# Patient Record
Sex: FEMALE | Race: WHITE | Employment: OTHER | URBAN - METROPOLITAN AREA
[De-identification: names, ages, dates, MRNs, and addresses within clinical notes are randomized per-mention and may not be internally consistent; named-entity substitution may affect disease eponyms.]

---

## 2022-07-12 ENCOUNTER — TRANSCRIBE ORDER (OUTPATIENT)
Dept: SCHEDULING | Age: 77
End: 2022-07-12

## 2022-07-12 DIAGNOSIS — M25.561 RIGHT KNEE PAIN: Primary | ICD-10-CM

## 2022-07-14 ENCOUNTER — HOSPITAL ENCOUNTER (OUTPATIENT)
Dept: PHYSICAL THERAPY | Age: 77
Discharge: HOME | End: 2022-07-14
Payer: MEDICARE

## 2022-07-14 PROCEDURE — 97161 PT EVAL LOW COMPLEX 20 MIN: CPT

## 2022-07-14 NOTE — PROGRESS NOTES
[facility] at [address] [place] [address] Drive: [phone]   Fax: [phone]  PLAN OF CARE / [address] [place] [address] PHYSICAL THERAPY SERVICES  Patient Name: Antwon Brooks : 1945   Medical   Diagnosis: Pain in right knee [M25.561] Treatment Diagnosis: Right knee pain   Onset Date: chronic     Referral Source: Cristiano Cazares MD Start of Care [facility]): 2022   Prior Hospitalization: See medical history Provider #: [id number]   Prior Level of Function: gardening   Comorbidities: Osteoporosis, OA, High blood pressure, Polymyositis, Prolapsed bladder   Medications: Verified on Patient Summary List   The Plan of Care and following information is based on the information from the initial evaluation.   ===========================================================================================  Assessment / key information:  Antwon Brooks is a 77 y.o. female presents with c/o right knee pain after falling on it 6 months ago due loss of balance. She reports a history of falling. Patient has polymyositis and has limited exercise capacity. She is tender to palpation over patella. She has reduced strength in bilateral LEs right more than left. She has reduced balance. Patient ambulates with independence demonstrating an antalgic gait pattern as she has reduced stance time on th right and decreased step length on the left. Patient ambulates with independence demonstrating an antalgic gait pattern as she has reduced stance time on the right and decreased step length on the left. Patient presentation is consistent with patellar femoral pain syndrome.  Patient will benefit from skilled aquatic and land based PT services to modify and progress therapeutic interventions, address functional mobility deficits, address ROM deficits, address strength deficits, analyze and address soft tissue restrictions, analyze and cue movement patterns, analyze and modify body mechanics/ergonomics and assess and modify postural abnormalities to attain her goals.      ===========================================================================================  Eval Complexity: History HIGH Complexity :3+ comorbidities / personal factors will impact the outcome/ POC ;  Examination  LOW Complexity : 1-2 Standardized tests and measures addressing body structure, function, activity limitation and / or participation in recreation ; Presentation LOW Complexity : Stable, uncomplicated ;  Decision Making MEDIUM Complexity : FOTO score of 26-74; Overall Complexity LOW   Problem List: pain affecting function, decrease ROM, decrease strength, edema affecting function, impaired gait/ balance, decrease ADL/ functional abilitiies, decrease activity tolerance, decrease flexibility/ joint mobility and decrease transfer abilities   Treatment Plan may include any combination of the following: Therapeutic exercise, Therapeutic activities, Neuromuscular re-education, Physical agent/modality, Gait/balance training, Manual therapy, Aquatic therapy, Patient education, Self Care training, Functional mobility training, Home safety training and Stair training  Patient / Family readiness to learn indicated by: asking questions, trying to perform skills and interest  Persons(s) to be included in education: patient (P)  Barriers to Learning/Limitations: no  Measures taken if barriers to learning:   Patient Goal (s): \"improvement\"   Patient self reported health status: good  Rehabilitation Potential: good  Goals:  Short Term Goals: To be accomplished in 4 weeks:   Patient will report compliance with HEP at least 1x/day to aid in rehabilitation program.   Status at IE:Will provide HEP on second visit   Current:Same as IE     Patient will demonstrate AROM of 0-140 degrees to aid in completion of ADLs. Status at IE:0-130 degrees   Current:Same as IE     Long Term Goals:  To be accomplished in 8 weeks:   Patient will increase strength to 5/5 throughout B LEs to aid in completion of ADLs. Status at IE:4-/5   Current:Same as IE     Patient will report pain less than 1-2/10 average to aid in completion of ADLs. Status at IE: 2-8/10   Current:Same as IE     Patient will perform 10 pain free kettle bell squats with 7lbs with good form/technique to aid in completion of ADLs. Status at IE: Pain with STS transfer   Current:Same as IE    Patient will improve FOTO to 55 points overall to demonstrate improvement in functional ability. Status at IE: FOTO score = 36 (an established functional score where 100 = no disability)   Current:Same as IE     Frequency / Duration:   Patient to be seen 2  times per week for 8  weeks:  Patient / Caregiver education and instruction: self care and exercises    Therapist Signature: Rios Hoffmann PT, DPT Date: 4/20/8725   Certification Period: 7/14/2022 - 9/12/2022 Time: 6:45 PM   ===========================================================================================  I certify that the above Physical Therapy Services are being furnished while the patient is under my care. I agree with the treatment plan and certify that this therapy is necessary. Physician Signature:        Date:       Time:     Please sign and return to [facility] or you may fax the signed copy to [phone]. Thank you.

## 2022-07-14 NOTE — PROGRESS NOTES
PT DAILY TREATMENT NOTE/KNEE EVAL 10-18    Patient Name: Jami Harris  Date:2022  : 1945  [x]  Patient  Verified  Payor: Jessica Born / Plan: [facility] [employer] MEDICARE CHOICE PPO/PFFS / Product Type: Managed Care Medicare /    In NQZS:4113  Out time:1020  Total Treatment Time (min): 45  Visit #: 1 of 16    Medicare/BCBS Only   Total Timed Codes (min):  0 1:1 Treatment Time:  45     Treatment Area: Pain in right knee [M25.561]    SUBJECTIVE  Pain Level (0-10 scale):  2  [x]constant []intermittent []improving []worsening []no change since onset    Any medication changes, allergies to medications, adverse drug reactions, diagnosis change, or new procedure performed?: [x] No    [] Yes (see summary sheet for update)  Subjective functional status/changes:       Patient presents with c/o right knee pain after falling on to it 6 months ago due loss of balance. Patient describes pain as sharp stabbing stiff. Denies numbness/tingling. Denies popping/clicking. Aggravating factors:movement, activity. Alleviating factors: rest.  Denies red flags: SOB, chest pain, dizziness/lightheadedness, blurred/double vision, HA, chills/fevers, night sweats, change in bowel/bladder control, abdominal pain, difficulty swallowing, slurred speech, unexplained weight gain/loss, nausea, vomiting. PMHx: Osteoporosis, OA, High blood pressure, Polymyositis, Prolapsed bladder Surgical Hx: . Social Hx: Lives with spouse in a single story home, work status: Retired. PLOF: Gardening. OBJECTIVE/EXAMINATION    45 min [x]Eval                  []Re-Eval          With   [x] TE   [] TA   [] neuro   [] other: Patient Education: [x] Review HEP    [] Progressed/Changed HEP based on:   [] positioning   [] body mechanics   [] transfers   [] heat/ice application    [] other:        Physical Therapy Evaluation - Knee    Posture:  Forward head and rounded shoulders    Gait:  [] Normal    [] Abnormal    [x] Antalgic    [] NWB    Device: none    Describe: Patient ambulates with independence demonstrating an antalgic gait pattern as she has reduced stance time on th right and decreased step length on the left. ROM / Strength  [] Unable to assess                  AROM                 Strength (1-5)    Left Right Left Right   Hip Flexion   4- 4-    Extension   4- 4-    Abduction   4- 4-    Adduction   4 4   Knee Flexion 140 130 4 3+    Extension 0 0 4 4-   Ankle Plantarflexion   4 4-    Dorsiflexion   4 4-       Palpation:   Neg/Pos  Neg/Pos  Neg/Pos   Joint Line Neg Quad tendon Neg Patellar ligament Pos   Patella Pos Fibular head Neg Pes Anserinus Neg   Tibial tubercle Neg Hamstring tendons Neg Infrapatellar fat pad Neg     Optional Tests:  Patellar Mobility   []L []R Hypermobile [x]L [x]R Hypomobile         Girth Measurements:     Cm above    Left 32   Right  35     Other tests/comments:  Requires UEA to perform SLS     Pain Level (0-10 scale) post treatment: 2    ASSESSMENT/Changes in Function:     Patient presents with c/o right knee pain after falling on it 6 months ago due loss of balance. She reports a history of falling. Patient has polymyositis and has limited exercise capacity. She is tender to palpation over patella. She has reduced strength in bilateral LEs right more than left. She has reduced balance. Patient ambulates with independence demonstrating an antalgic gait pattern as she has reduced stance time on th right and decreased step length on the left. Patient ambulates with independence demonstrating an antalgic gait pattern as she has reduced stance time on the right and decreased step length on the left. Patient presentation is consistent with patellar femoral pain syndrome.  Patient will benefit from skilled aquatic and land based PT services to modify and progress therapeutic interventions, address functional mobility deficits, address ROM deficits, address strength deficits, analyze and address soft tissue restrictions, analyze and cue movement patterns, analyze and modify body mechanics/ergonomics and assess and modify postural abnormalities to attain her goals.      []  See Plan of Care  []  See progress note/recertification  []  See Discharge Summary         Progress towards goals / Updated goals:  See POC    PLAN  []  Upgrade activities as tolerated     [x]  Continue plan of care  []  Update interventions per flow sheet       []  Discharge due to:_  []  Other:_      Jeanie Cruz PT, DPT 7/14/2022  9:38 AM

## 2022-07-18 ENCOUNTER — HOSPITAL ENCOUNTER (OUTPATIENT)
Dept: PHYSICAL THERAPY | Age: 77
End: 2022-07-18
Payer: MEDICARE

## 2022-07-21 ENCOUNTER — HOSPITAL ENCOUNTER (OUTPATIENT)
Dept: PHYSICAL THERAPY | Age: 77
Discharge: HOME | End: 2022-07-21
Payer: MEDICARE

## 2022-07-21 PROCEDURE — 97530 THERAPEUTIC ACTIVITIES: CPT

## 2022-07-21 PROCEDURE — 97110 THERAPEUTIC EXERCISES: CPT

## 2022-07-21 NOTE — PROGRESS NOTES
PT DAILY TREATMENT NOTE - [facility]     Patient Name: Cee Skelton  Date:2022  : 1945  [x]  Patient  Verified  Payor: Nida Bautista / Plan: [facility] HUMANA MEDICARE CHOICE PPO/PFFS / Product Type: Managed Care Medicare /    In time:1100  Out time:1140  Total Treatment Time (min): 40  Total Timed Codes (min): 40   1:1 Treatment Time ([facility] only): 40   Visit #: 2 of 16    Treatment Area: Pain in right knee [M25.561]    SUBJECTIVE  Pain Level (0-10 scale): 5  Any medication changes, allergies to medications, adverse drug reactions, diagnosis change, or new procedure performed?: [x] No    [] Yes (see summary sheet for update)  Subjective functional status/changes:   [] No changes reported    Patient reports that she has increased knee pain today. OBJECTIVE    30 min Therapeutic Exercise:  [x] See flow sheet :   Rationale: increase ROM, increase strength and decrease pain to improve the patients ability to complete ADLs    10 min Therapeutic Activity:  [x]  See flow sheet :   Rationale: increase ROM, increase strength and improve coordination  to improve the patients ability to complete ADLs         With   [x] TE   [] TA   [] neuro   [] other: Patient Education: [x] Review HEP    [] Progressed/Changed HEP based on:   [] positioning   [] body mechanics   [] transfers   [] heat/ice application    [] other:      Other Objective/Functional Measures: NA     Pain Level (0-10 scale) post treatment: 3    ASSESSMENT/Changes in Function: Patient responds well to treatment session. Provided cues to recall exercise parameters and for activity pacing. Encouraged muscle recovery periods to promote quality muscle recruitment during exercise. Reviewed HEP and provided handout.  No adverse effects were noted from today's treatment session    Patient will continue to benefit from skilled PT services to modify and progress therapeutic interventions, address functional mobility deficits, address ROM deficits, address strength deficits, analyze and address soft tissue restrictions, analyze and cue movement patterns, analyze and modify body mechanics/ergonomics, assess and modify postural abnormalities, address imbalance and instruct in home and community integration to attain remaining goals. []  See Plan of Care  []  See progress note/recertification  []  See Discharge Summary         Progress towards goals / Updated goals:  Short Term Goals: To be accomplished in 4 weeks:                 Patient will report compliance with HEP at least 1x/day to aid in rehabilitation program.                 Status at IE:Will provide HEP on second visit                 Current: In-progress, provided initial HEP, 7/21/2022                    Patient will demonstrate AROM of 0-140 degrees to aid in completion of ADLs. Status at IE:0-130 degrees                 Current:Same as IE      Long Term Goals: To be accomplished in 8 weeks:                 Patient will increase strength to 5/5 throughout B LEs to aid in completion of ADLs. Status at IE:4-/5                 Current:Same as IE                    Patient will report pain less than 1-2/10 average to aid in completion of ADLs. Status at IE: 2-8/10                 Current:Same as IE                    Patient will perform 10 pain free kettle bell squats with 7lbs with good form/technique to aid in completion of ADLs. Status at IE: Pain with STS transfer                 Current:Same as IE     Patient will improve FOTO to 55 points overall to demonstrate improvement in functional ability.                  Status at IE: FOTO score = 36 (an established functional score where 100 = no disability)                 Current:Same as IE     PLAN  []  Upgrade activities as tolerated     [x]  Continue plan of care  []  Update interventions per flow sheet       []  Discharge due to:_  []  Other:_      Joanne Andrew, PT, DPT 7/21/2022  11:04 AM    Future Appointments   Date Time Provider Mally Jamison   7/28/2022  4:45 PM Grieclda CALLAWAY [facility]   8/2/2022  9:30 AM MARINA Worthington [facility]   8/4/2022  9:30 AM HOOD Crenshaw THE [facility]

## 2022-07-28 ENCOUNTER — HOSPITAL ENCOUNTER (OUTPATIENT)
Dept: PHYSICAL THERAPY | Age: 77
Discharge: HOME | End: 2022-07-28
Payer: MEDICARE

## 2022-07-28 PROCEDURE — 97016 VASOPNEUMATIC DEVICE THERAPY: CPT

## 2022-07-28 PROCEDURE — 97110 THERAPEUTIC EXERCISES: CPT

## 2022-07-28 PROCEDURE — 97112 NEUROMUSCULAR REEDUCATION: CPT

## 2022-07-28 PROCEDURE — 97530 THERAPEUTIC ACTIVITIES: CPT

## 2022-07-28 NOTE — PROGRESS NOTES
PT DAILY TREATMENT NOTE    Patient Name: Ilda Goldstein  Date:2022  : 1945  [x]  Patient  Verified  Payor: Jimmie Aguayo / Plan: [facility] HUMAN MEDICARE CHOICE PPO/PFFS / Product Type: Managed Care Medicare /    In time:450  Out time:540  Total Treatment Time (min): 50  Total Timed Codes (min): 40  1:1 Treatment Time ([facility] only):    Visit #: 3 of 16    Treatment Dx: Pain in right knee [M25.561]    SUBJECTIVE  Pain Level (0-10 scale): 6  Any medication changes, allergies to medications, adverse drug reactions, diagnosis change, or new procedure performed?: [x] No    [] Yes (see summary sheet for update)  Subjective functional status/changes:   [] No changes reported  \"The knee injection is wearing off. I can start feeling the pain in my knee again. \"    OBJECTIVE    Modalities Rationale:     decrease edema, decrease inflammation, decrease pain, and increase tissue extensibility to improve patient's ability to return PLOF   min [] Estim, type/location:                                      []  att     []  unatt     []  w/US     []  w/ice    []  w/heat    min []  Mechanical Traction: type/lbs                   []  pro   []  sup   []  int   []  cont    []  before manual    []  after manual    min []  Ultrasound, settings/location:      min []  Iontophoresis w/ dexamethasone, location:                                               []  take home patch       []  in clinic    min []  Ice     []  Heat    location/position:    10 min [x]  Vasopneumatic Device, press/temp: Right knee/medium/34 degrees   If using vaso (only need to measure limb vaso being performed on)      pre-treatment girth : 38 cm      post-treatment girth 37.5 cm      measured at (landmark location) :  mid-patella    min []  Other:    [x] Skin assessment post-treatment (if applicable):    [x]  intact    []  redness- no adverse reaction                  []redness - adverse reaction:       20 min Therapeutic Exercise:  [x] See flow sheet : Rationale: increase ROM, increase strength, improve coordination, and improve balance to improve the patients ability to return PLOF    10 min Therapeutic Activity:  [x]  See flow sheet :   Rationale: increase ROM, increase strength, improve coordination, and improve balance  to improve the patients ability to perform ADL's without pain and symptom levels     10 min Neuromuscular Re-education:  [x]  See flow sheet :   Rationale: increase ROM, increase strength, improve coordination, and improve balance  to improve the patients ability to perform ADL's without pain and symptom levels              With   [] TE   [] TA   [] neuro   [] other: Patient Education: [x] Review HEP    [] Progressed/Changed HEP based on:   [] positioning   [] body mechanics   [] transfers   [] heat/ice application    [] other:      Other Objective/Functional Measures:   -difficulty with SLR demonstrate muscle weakness. Pain Level (0-10 scale) post treatment: 0    ASSESSMENT/Changes in Function:   Patient tolerated treatment session well today. Patient challenge with SLR, demonstrate muscle weakness. Cues with eccentric quadriceps control. Patient continues to have difficulty with prolonged walking with increased knee pain. Patient continues to make steady progress toward goals and would benefit from continued skilled PT intervention to address remaining deficits outlined in goals below. Patient will continue to benefit from skilled PT services to modify and progress therapeutic interventions, address functional mobility deficits, address ROM deficits, address strength deficits, analyze and address soft tissue restrictions, analyze and cue movement patterns, analyze and modify body mechanics/ergonomics, and assess and modify postural abnormalities to attain remaining goals.      [x]  See Plan of Care  []  See progress note/recertification  []  See Discharge Summary         Progress towards goals / Updated goals:  Short Term Goals: To be accomplished in 4 weeks:                 Patient will report compliance with HEP at least 1x/day to aid in rehabilitation program.                 Status at IE:Will provide HEP on second visit                 Current: In-progress, provided initial HEP, 7/21/2022                    Patient will demonstrate AROM of 0-140 degrees to aid in completion of ADLs. Status at IE:0-130 degrees                 Current:Same as IE      Long Term Goals: To be accomplished in 8 weeks:                 Patient will increase strength to 5/5 throughout B LEs to aid in completion of ADLs. Status at IE:4-/5                 Current:Same as IE                    Patient will report pain less than 1-2/10 average to aid in completion of ADLs. Status at IE: 2-8/10                 Current: 7/10 pain at worst with ADL's. 7/28/22 IN PROGRESS                    Patient will perform 10 pain free kettle bell squats with 7lbs with good form/technique to aid in completion of ADLs. Status at IE: Pain with STS transfer                 Current:Same as IE     Patient will improve FOTO to 55 points overall to demonstrate improvement in functional ability.                  Status at IE: FOTO score = 36 (an established functional score where 100 = no disability)                 Current:Same as IE       PLAN  []  Upgrade activities as tolerated     [x]  Continue plan of care  []  Update interventions per flow sheet       []  Discharge due to:_  []  Other:_      Pam Luke PTA 7/28/2022  9:23 AM    Future Appointments   Date Time Provider Mally Jamison   7/28/2022  4:45 PM HOOD HutchinsTFABI [facility]   8/2/2022  9:30 AM Luis Alberto Alert, PT [facility]   8/4/2022  9:30 AM Edi Rucker PTA [facility]   8/9/2022  9:30 AM Luis Alberto Alert, PT [facility]   8/11/2022  8:00 AM Nia Hidalgo PT [facility]

## 2022-08-02 ENCOUNTER — HOSPITAL ENCOUNTER (OUTPATIENT)
Dept: PHYSICAL THERAPY | Age: 77
Discharge: HOME | End: 2022-08-02
Payer: MEDICARE

## 2022-08-02 PROCEDURE — 97112 NEUROMUSCULAR REEDUCATION: CPT

## 2022-08-02 PROCEDURE — 97110 THERAPEUTIC EXERCISES: CPT

## 2022-08-02 PROCEDURE — 97530 THERAPEUTIC ACTIVITIES: CPT

## 2022-08-02 NOTE — PROGRESS NOTES
PT DAILY TREATMENT NOTE    Patient Name: Ernesto Grimm  Date:2022  : 1945  [x]  Patient  Verified  Payor: Basia Brand / Plan: [facility] [employer] MEDICARE CHOICE PPO/PFFS / Product Type: Managed Care Medicare /    In time: 379  Out time: 1009  Total Treatment Time (min): 44  Total Timed Codes (min): 44  1:1 Treatment Time (MC only): 40   Visit #: 4 of 16    Treatment Dx: Pain in right knee [M25.561]    SUBJECTIVE  Pain Level (0-10 scale): 0  Any medication changes, allergies to medications, adverse drug reactions, diagnosis change, or new procedure performed?: [x] No    [] Yes (see summary sheet for update)  Subjective functional status/changes:   [] No changes reported  \"The knee has been pretty calm lately. But, it still gives me a sharp pain with certain motions. \"    OBJECTIVE    15 min Therapeutic Exercise:  [x] See flow sheet :   Rationale: increase ROM, increase strength and decrease pain to improve the patients ability to complete ADLs  ambulation safety and efficiency in order to improve patient's ability to safely ambulate at home for self care. 15 min Therapeutic Activity:  [x]  See flow sheet :   Rationale: increase ROM, increase strength and improve coordination  to improve the patients ability to Complete ADLS     14 min Neuromuscular Re-education:  [x]  See flow sheet :   Rationale: improve coordination, improve balance and increase proprioception  to improve the patients ability to complete ADLS, and decrease falls risk    With   [] TE   [] TA   [] neuro   [] other: Patient Education: [x] Review HEP    [] Progressed/Changed HEP based on:   [] positioning   [] body mechanics   [] transfers   [] heat/ice application    [] other:      Other Objective/Functional Measures: NA     Pain Level (0-10 scale) post treatment: 0    ASSESSMENT/Changes in Function: Patient instructed in initial Aquatic Exercise Program for Management of Knee Pain.  Noted slight increased pain with hip flexor stretches (butt kicks) but tolerated all other exercises well. Patient responds well to treatment session. No adverse effects were noted from today's treatment session. Patient will continue to benefit from skilled PT services to modify and progress therapeutic interventions, address functional mobility deficits, address ROM deficits, address strength deficits, analyze and address soft tissue restrictions, analyze and cue movement patterns, analyze and modify body mechanics/ergonomics, assess and modify postural abnormalities,  and instruct in home and community integration to attain remaining goals. [x]  See Plan of Care  []  See progress note/recertification  []  See Discharge Summary         Progress towards goals / Updated goals:  Short Term Goals: To be accomplished in 4 weeks:                 Patient will report compliance with HEP at least 1x/day to aid in rehabilitation program.                 Status at IE:Will provide HEP on second visit                 Current: IPatient instructed in initial Aquatic Exercise Program for Management of Knee Pain. 8/2/2022, in progress                    Patient will demonstrate AROM of 0-140 degrees to aid in completion of ADLs. Status at IE:0-130 degrees                 Current:Same as IE      Long Term Goals: To be accomplished in 8 weeks:                 Patient will increase strength to 5/5 throughout B LEs to aid in completion of ADLs. Status at IE:4-/5                 Current:Same as IE                    Patient will report pain less than 1-2/10 average to aid in completion of ADLs. Status at IE: 2-8/10                 Current: 7/10 pain at worst with ADL's. 7/28/22 IN PROGRESS                    Patient will perform 10 pain free kettle bell squats with 7lbs with good form/technique to aid in completion of ADLs.                  Status at IE: Pain with STS transfer                 Current:Same as IE     Patient will improve FOTO to 55 points overall to demonstrate improvement in functional ability.                  Status at IE: FOTO score = 36 (an established functional score where 100 = no disability)                 Current:Same as IE    PLAN  []  Upgrade activities as tolerated     [x]  Continue plan of care  []  Update interventions per flow sheet       []  Discharge due to:_  []  Other:_      Xin Polk, PT 8/2/2022  10:08 AM    Future Appointments   Date Time Provider Mally Jamison   8/4/2022  9:30 AM Manjeet Wilkinson, PTA [facility]   8/9/2022  9:30 AM Arcenio Connors, PT [facility]   8/11/2022  8:00 AM Sarah Hernandez, PT [facility]

## 2022-08-04 ENCOUNTER — HOSPITAL ENCOUNTER (OUTPATIENT)
Dept: PHYSICAL THERAPY | Age: 77
Discharge: HOME | End: 2022-08-04
Payer: MEDICARE

## 2022-08-04 PROCEDURE — 97016 VASOPNEUMATIC DEVICE THERAPY: CPT

## 2022-08-04 PROCEDURE — 97140 MANUAL THERAPY 1/> REGIONS: CPT

## 2022-08-04 PROCEDURE — 97530 THERAPEUTIC ACTIVITIES: CPT

## 2022-08-04 PROCEDURE — 97110 THERAPEUTIC EXERCISES: CPT

## 2022-08-04 NOTE — PROGRESS NOTES
PT DAILY TREATMENT NOTE    Patient Name: Catalina Grew  Date:2022  : 1945  [x]  Patient  Verified  Payor: Dahlia Ling / Plan: [facility] [employer] MEDICARE CHOICE PPO/PFFS / Product Type: Managed Care Medicare /    In time:928  Out time:1025  Total Treatment Time (min): 57  Total Timed Codes (min): 47  1:1 Treatment Time (MC/BCBS only): 52   Visit #: 5 of 16    Treatment Dx: Pain in right knee [M25.561]    SUBJECTIVE  Pain Level (0-10 scale): 7-8  Any medication changes, allergies to medications, adverse drug reactions, diagnosis change, or new procedure performed?: [x] No    [] Yes (see summary sheet for update)  Subjective functional status/changes:   [] No changes reported  Pt reports of overall soreness after last session with aquatic therapy. She states of right LE tightness and pain today.     OBJECTIVE    Modalities Rationale:     decrease edema, decrease inflammation, and decrease pain to improve patient's ability to return PLOF   min [] Estim, type/location:                                      []  att     []  unatt     []  w/US     []  w/ice    []  w/heat    min []  Mechanical Traction: type/lbs                   []  pro   []  sup   []  int   []  cont    []  before manual    []  after manual    min []  Ultrasound, settings/location:      min []  Iontophoresis w/ dexamethasone, location:                                               []  take home patch       []  in clinic    min []  Ice     []  Heat    location/position:    10 min [x]  Vasopneumatic Device, press/temp: Right knee/ medium/34 degrees   If using vaso (only need to measure limb vaso being performed on)      pre-treatment girth : 34.5 cm      post-treatment girth : 33.5 cm      measured at (landmark location) :  mid-patella    min []  Other:    [x] Skin assessment post-treatment (if applicable):    [x]  intact    []  redness- no adverse reaction                  []redness - adverse reaction:          20 min Therapeutic Exercise: [x] See flow sheet :   Rationale: increase ROM, increase strength, improve coordination, and improve balance to improve the patients ability to return PLOF    17 min Therapeutic Activity:  [x]  See flow sheet :   Rationale: increase ROM, increase strength, improve coordination, and improve balance  to improve the patients ability to perform ADL's without pain and symptom levels     10 min Manual Therapy:  supine/ STM to hamstrings, quads, and calf /right knee PROM flexion and extension   Rationale: decrease pain, increase ROM, increase tissue extensibility, decrease edema , and decrease trigger points to improve the patients ability to perform ADL's without pain and symptom levels  The manual therapy interventions were performed at a separate and distinct time from the therapeutic activities interventions. With   [] TE   [] TA   [] neuro   [] other: Patient Education: [x] Review HEP    [] Progressed/Changed HEP based on:   [] positioning   [] body mechanics   [] transfers   [x] heat/ice application    [] other:      Other Objective/Functional Measures:   Pt improved with SLR with less pain with increase hip ROM    Pain Level (0-10 scale) post treatment: 5    ASSESSMENT/Changes in Function:   Patient reports of soreness and pain post aquatic session. Today patient was provided with gentle exercises due to increased pain since last visit. Added ball squeeze and hamstring stretch to improve knee mobility and strength. Cues to maintain 5 seconds hold with each repetition. Patient had good tolerance with exercise program today without any adverse reaction. Patient improved with SLR with less pain with increase hip ROM. Patient demonstrated good progression with right knee AROM 0-138 degrees today. Patient continues to have difficulty with going up and down the  the stairs and STS at home due to right knee pain. Patient education on icing to reduce swelling.  Patient continues to make steady progress toward goals and would benefit from continued skilled PT intervention to address remaining deficits outlined in goals below. Patient will continue to benefit from skilled PT services to modify and progress therapeutic interventions, address functional mobility deficits, address ROM deficits, address strength deficits, analyze and address soft tissue restrictions, analyze and cue movement patterns, analyze and modify body mechanics/ergonomics, and assess and modify postural abnormalities to attain remaining goals. [x]  See Plan of Care  []  See progress note/recertification  []  See Discharge Summary         Progress towards goals / Updated goals:  Short Term Goals: To be accomplished in 4 weeks:                 Patient will report compliance with HEP at least 1x/day to aid in rehabilitation program.                 Status at IE:Will provide HEP on second visit                 Current: IPatient instructed in initial Aquatic Exercise Program for Management of Knee Pain. 8/2/2022, in progress                    Patient will demonstrate AROM of 0-140 degrees to aid in completion of ADLs. Status at IE:0-130 degrees                 Current 8/4/22: Right knee AROM 0-138 degrees. IN PROGRESS      Long Term Goals: To be accomplished in 8 weeks:                 Patient will increase strength to 5/5 throughout B LEs to aid in completion of ADLs. Status at IE:4-/5                 Current:Same as IE                    Patient will report pain less than 1-2/10 average to aid in completion of ADLs. Status at IE: 2-8/10                 Current: 7/10 pain at worst with ADL's. 7/28/22 IN PROGRESS                    Patient will perform 10 pain free kettle bell squats with 7lbs with good form/technique to aid in completion of ADLs.                  Status at IE: Pain with STS transfer                 Current:Same as IE     Patient will improve FOTO to 55 points overall to demonstrate improvement in functional ability.                  Status at IE: FOTO score = 36 (an established functional score where 100 = no disability)                 Current:Same as IE       PLAN  []  Upgrade activities as tolerated     [x]  Continue plan of care  []  Update interventions per flow sheet       []  Discharge due to:_  []  Other:_      Mary Villavicencio PTA 8/4/2022  9:23 AM    Future Appointments   Date Time Provider Mally Jamison   8/4/2022  9:30 AM [facility]   8/9/2022  9:30 AM Walt Habermann, PT [facility]   8/11/2022  8:00 AM Claudene Cons, PT [facility]

## 2022-08-09 ENCOUNTER — HOSPITAL ENCOUNTER (OUTPATIENT)
Dept: PHYSICAL THERAPY | Age: 77
Discharge: HOME | End: 2022-08-09
Payer: MEDICARE

## 2022-08-09 PROCEDURE — 97530 THERAPEUTIC ACTIVITIES: CPT

## 2022-08-09 PROCEDURE — 97112 NEUROMUSCULAR REEDUCATION: CPT

## 2022-08-09 PROCEDURE — 97110 THERAPEUTIC EXERCISES: CPT

## 2022-08-09 NOTE — PROGRESS NOTES
PT DAILY TREATMENT NOTE    Patient Name: Hue Thomas  Date:2022  : 1945  [x]  Patient  Verified  Payor: Daron  / Plan: [facility] [employer] MEDICARE CHOICE PPO/PFFS / Product Type: Managed Care Medicare /    In time: 226  Out time: 4351  Total Treatment Time (min): 53  Total Timed Codes (min): 48  1:1 Treatment Time ( only): 40   Visit #: 6 of 16    Treatment Dx: Pain in right knee [M25.561]    SUBJECTIVE  Pain Level (0-10 scale): 3  Any medication changes, allergies to medications, adverse drug reactions, diagnosis change, or new procedure performed?: [x] No    [] Yes (see summary sheet for update)  Subjective functional status/changes:   [] No changes reported  \"Pain is not too bad today. \"    OBJECTIVE    15 min Therapeutic Exercise:  [x] See flow sheet :   Rationale: increase ROM, increase strength and decrease pain to improve the patients ability to complete ADLs  ambulation safety and efficiency in order to improve patient's ability to safely ambulate at home for self care. 15 min Therapeutic Activity:  [x]  See flow sheet :   Rationale: increase ROM, increase strength and improve coordination  to improve the patients ability to Complete ADLS     10 min Neuromuscular Re-education:  [x]  See flow sheet :   Rationale: improve coordination, improve balance and increase proprioception  to improve the patients ability to complete ADLS, and decrease falls risk    With   [] TE   [] TA   [] neuro   [] other: Patient Education: [x] Review HEP    [] Progressed/Changed HEP based on:   [] positioning   [] body mechanics   [] transfers   [] heat/ice application    [] other:      Other Objective/Functional Measures: NA     Pain Level (0-10 scale) post treatment: 1    ASSESSMENT/Changes in Function: Patient educated further in optimal technique and rationale for sequence of aquatic exercises. Patient responds well to treatment session.   No adverse effects were noted from today's treatment session. Patient will continue to benefit from skilled PT services to modify and progress therapeutic interventions, address functional mobility deficits, address ROM deficits, address strength deficits, analyze and address soft tissue restrictions, analyze and cue movement patterns, analyze and modify body mechanics/ergonomics, assess and modify postural abnormalities,  and instruct in home and community integration to attain remaining goals. [x]  See Plan of Care  []  See progress note/recertification  []  See Discharge Summary         Progress towards goals / Updated goals:  Short Term Goals: To be accomplished in 4 weeks:                 Patient will report compliance with HEP at least 1x/day to aid in rehabilitation program.                 Status at IE:Will provide HEP on second visit                 Current: IPatient instructed in initial Aquatic Exercise Program for Management of Knee Pain. 8/2/2022, in progress                    Patient will demonstrate AROM of 0-140 degrees to aid in completion of ADLs. Status at IE:0-130 degrees                 Current 8/4/22: Right knee AROM 0-138 degrees. IN PROGRESS      Long Term Goals: To be accomplished in 8 weeks:                 Patient will increase strength to 5/5 throughout B LEs to aid in completion of ADLs. Status at IE:4-/5                 Current:Same as IE                    Patient will report pain less than 1-2/10 average to aid in completion of ADLs. Status at IE: 2-8/10                 Current: Pain reduced to 3/10 intensity today. 8/9/2022, IN PROGRESS                    Patient will perform 10 pain free kettle bell squats with 7lbs with good form/technique to aid in completion of ADLs. Status at IE: Pain with STS transfer                 Current:Same as IE     Patient will improve FOTO to 55 points overall to demonstrate improvement in functional ability. Status at IE: FOTO score = 36 (an established functional score where 100 = no disability)                 Current:Same as IE    PLAN  []  Upgrade activities as tolerated     [x]  Continue plan of care  []  Update interventions per flow sheet       []  Discharge due to:_  []  Other:_      Keli Astorga, PT 8/9/2022  9:43 AM    Future Appointments   Date Time Provider Mally Jamison   8/11/2022  8:00 AM Carmen Cooley, PT [facility]   8/16/2022 10:15 AM Jomar Hinson, PT [facility]   8/18/2022  9:30 AM Jomar Hinson PT [facility]   8/23/2022  9:30 AM Jomar Hnison PT [facility]   8/25/2022  9:30 AM Isael Philippe, HOOD [facility]

## 2022-08-11 ENCOUNTER — HOSPITAL ENCOUNTER (OUTPATIENT)
Dept: PHYSICAL THERAPY | Age: 77
Discharge: HOME | End: 2022-08-11
Payer: MEDICARE

## 2022-08-11 PROCEDURE — 97016 VASOPNEUMATIC DEVICE THERAPY: CPT

## 2022-08-11 PROCEDURE — 97530 THERAPEUTIC ACTIVITIES: CPT

## 2022-08-11 PROCEDURE — 97110 THERAPEUTIC EXERCISES: CPT

## 2022-08-11 PROCEDURE — 97140 MANUAL THERAPY 1/> REGIONS: CPT

## 2022-08-11 NOTE — PROGRESS NOTES
PT DAILY TREATMENT NOTE    Patient Name: Akira Phoenix  Date:2022  : 1945  [x]  Patient  Verified  Payor: Elle Merchant / Plan: [facility] [employer] MEDICARE CHOICE PPO/PFFS / Product Type: Managed Care Medicare /    In time:358  Out time:447  Total Treatment Time (min): 49  Total Timed Codes (min): 39  1:1 Treatment Time (MC/BCBS only): 39   Visit #: 7 of 16    Treatment Dx: Pain in right knee [M25.561]    SUBJECTIVE  Pain Level (0-10 scale): 4  Any medication changes, allergies to medications, adverse drug reactions, diagnosis change, or new procedure performed?: [x] No    [] Yes (see summary sheet for update)  Subjective functional status/changes:   [] No changes reported  \"My body is achy all over today. \"    OBJECTIVE    Modalities Rationale:     decrease edema, decrease inflammation, decrease pain, and increase tissue extensibility to improve patient's ability to return PLOF   min [] Estim, type/location:                                      []  att     []  unatt     []  w/US     []  w/ice    []  w/heat    min []  Mechanical Traction: type/lbs                   []  pro   []  sup   []  int   []  cont    []  before manual    []  after manual    min []  Ultrasound, settings/location:      min []  Iontophoresis w/ dexamethasone, location:                                               []  take home patch       []  in clinic    min []  Ice     []  Heat    location/position:    10 min [x]  Vasopneumatic Device, press/temp: Right knee/ medium/ 34 degrees   If using vaso (only need to measure limb vaso being performed on)      pre-treatment girth : 34.5 cm      post-treatment girth : 34 cm      measured at (landmark location) :  mid-patella    min []  Other:    [x] Skin assessment post-treatment (if applicable):    [x]  intact    []  redness- no adverse reaction                  []redness - adverse reaction:      19 min Therapeutic Exercise:  [x] See flow sheet :   Rationale: increase ROM, increase strength, improve coordination, and improve balance to improve the patients ability to return PLOF    10 min Therapeutic Activity:  [x]  See flow sheet :   Rationale: increase ROM, increase strength, improve coordination, and improve balance  to improve the patients ability to perform ADL's without pain and symptom levels     10 min Manual Therapy:  hooklying/ right knee STM hamstrings and gastrocnemius/ PROM right knee flexion and extension    Rationale: decrease pain, increase ROM, increase tissue extensibility, decrease edema , decrease trigger points, and increase postural awareness to improve the patients ability to perform prior physical activities  The manual therapy interventions were performed at a separate and distinct time from the therapeutic activities interventions. With   [] TE   [] TA   [] neuro   [] other: Patient Education: [x] Review HEP    [] Progressed/Changed HEP based on:   [] positioning   [] body mechanics   [] transfers   [] heat/ice application    [] other:      Other Objective/Functional Measures: NA     Pain Level (0-10 scale) post treatment: 0    ASSESSMENT/Changes in Function:   Patient tolerated treatment session well today. Patient demonstrated muscle fatigue with SLR. Cues with bridge with 5 seconds hold to improve hamstring contraction. Patient continues to make steady progress toward goals and would benefit from continued skilled PT intervention to address remaining deficits outlined in goals below. Patient will continue to benefit from skilled PT services to modify and progress therapeutic interventions, address functional mobility deficits, address ROM deficits, address strength deficits, analyze and address soft tissue restrictions, analyze and cue movement patterns, analyze and modify body mechanics/ergonomics, and assess and modify postural abnormalities to attain remaining goals.      [x]  See Plan of Care  []  See progress note/recertification  []  See Discharge Summary         Progress towards goals / Updated goals:  Short Term Goals: To be accomplished in 4 weeks:                 Patient will report compliance with HEP at least 1x/day to aid in rehabilitation program.                 Status at IE:Will provide HEP on second visit                 Current 8/11/22: Patient reports of compliant with HEP 1-2x/10. IN PROGRESS                    Patient will demonstrate AROM of 0-140 degrees to aid in completion of ADLs. Status at IE:0-130 degrees                 Current 8/4/22: Right knee AROM 0-138 degrees. IN PROGRESS      Long Term Goals: To be accomplished in 8 weeks:                 Patient will increase strength to 5/5 throughout B LEs to aid in completion of ADLs. Status at IE:4-/5                             Patient will report pain less than 1-2/10 average to aid in completion of ADLs. Status at IE: 2-8/10                 Current: Pain reduced to 3/10 intensity today. 8/9/2022, IN PROGRESS                    Patient will perform 10 pain free kettle bell squats with 7lbs with good form/technique to aid in completion of ADLs. Status at IE: Pain with STS transfer                 Current:Same as IE     Patient will improve FOTO to 55 points overall to demonstrate improvement in functional ability.                  Status at IE: FOTO score = 36 (an established functional score where 100 = no disability)                 Current:Same as IE       PLAN  []  Upgrade activities as tolerated     [x]  Continue plan of care  []  Update interventions per flow sheet       []  Discharge due to:_  []  Other:_      Ethan Blanco PTA 8/11/2022  10:15 AM    Future Appointments   Date Time Provider Mally Jamison   8/11/2022  4:00 PM HOOD Ling [facility]   8/16/2022 10:15 AM Nadiya Umana PT [facility]   8/18/2022  9:30 AM Genesis Ruiz [facility]   8/23/2022  9:30 AM MARINA Sam [facility] 8/25/2022  9:30 AM [facility] Vergara [facility]

## 2022-08-16 ENCOUNTER — HOSPITAL ENCOUNTER (OUTPATIENT)
Dept: PHYSICAL THERAPY | Age: 77
Discharge: HOME | End: 2022-08-16
Payer: MEDICARE

## 2022-08-16 PROCEDURE — 97530 THERAPEUTIC ACTIVITIES: CPT

## 2022-08-16 PROCEDURE — 97112 NEUROMUSCULAR REEDUCATION: CPT

## 2022-08-16 PROCEDURE — 97110 THERAPEUTIC EXERCISES: CPT

## 2022-08-16 NOTE — PROGRESS NOTES
PT DAILY TREATMENT NOTE    Patient Name: Jami Harris  Date:2022  : 1945  [x]  Patient  Verified  Payor: Jessica Trevor / Plan: [facility] MEDICARE CHOICE PPO/PFFS / Product Type: Managed Care Medicare /    In time: 041  Out time: 8647  Total Treatment Time (min): 58  Total Timed Codes (min): 53  1:1 Treatment Time ([facility] only): 39   Visit #: 8 of 16    Treatment Dx: Pain in right knee [M25.561]    SUBJECTIVE  Pain Level (0-10 scale): 4  Any medication changes, allergies to medications, adverse drug reactions, diagnosis change, or new procedure performed?: [x] No    [] Yes (see summary sheet for update)  Subjective functional status/changes:   [] No changes reported  \"I had a lubricant injection into my knee yesterday and it is a bit more swollen and stiff today. \"    OBJECTIVE    15 min Therapeutic Exercise:  [x] See flow sheet :   Rationale: increase ROM, increase strength and decrease pain to improve the patients ability to complete ADLs  ambulation safety and efficiency in order to improve patient's ability to safely ambulate at home for self care.         15 min Therapeutic Activity:  [x]  See flow sheet :   Rationale: increase ROM, increase strength and improve coordination  to improve the patients ability to Complete ADLS     15 min Neuromuscular Re-education:  [x]  See flow sheet :   Rationale: improve coordination, improve balance and increase proprioception  to improve the patients ability to complete ADLS, and decrease falls risk    With   [] TE   [] TA   [] neuro   [] other: Patient Education: [x] Review HEP    [] Progressed/Changed HEP based on:   [] positioning   [] body mechanics   [] transfers   [] heat/ice application    [] other:      Other Objective/Functional Measures: NA     Pain Level (0-10 scale) post treatment: 0    ASSESSMENT/Changes in Function: Patient has been well motivated, consistent and diligent with land and aquatic PT and with HEP and as a result is making gradual but steady progress towards goals. Overall pain intensity is reduced, although it fluctuates significantly with activity level. Strength and functional activity tolerance are progressing. Patient responds well to treatment session. No adverse effects were noted from today's treatment session. Patient will continue to benefit from skilled PT services to modify and progress therapeutic interventions, address functional mobility deficits, address ROM deficits, address strength deficits, analyze and address soft tissue restrictions, analyze and cue movement patterns, analyze and modify body mechanics/ergonomics, assess and modify postural abnormalities,  and instruct in home and community integration to attain remaining goals. []  See Plan of Care  [x]  See progress note/recertification  []  See Discharge Summary         Progress towards goals / Updated goals:  Short Term Goals: To be accomplished in 4 weeks:                 Patient will report compliance with HEP at least 1x/day to aid in rehabilitation program.                 Status at IE:Will provide HEP on second visit                 Current 8/11/22: Patient reports of compliant with HEP 1-2x/10. IN PROGRESS                    Patient will demonstrate AROM of 0-140 degrees to aid in completion of ADLs. Status at IE:0-130 degrees                 Current 8/4/22: Right knee AROM 0-138 degrees. IN PROGRESS      Long Term Goals: To be accomplished in 8 weeks:                 Patient will increase strength to 5/5 throughout B LEs to aid in completion of ADLs. Status at IE:4-/5      Current: improved to 4/5.     8/16/2022, IN PROGRESS                          Patient will report pain less than 1-2/10 average to aid in completion of ADLs. Status at IE: 2-8/10                 Current: Pain reduced to 3/10 intensity today.         8/9/2022, IN PROGRESS                    Patient will perform 10 pain free kettle lara squats with 7lbs with good form/technique to aid in completion of ADLs. Status at IE: Pain with STS transfer                 Current: Pain now minimal with single repetition sit to stand. 8/16/2022, IN PROGRESS     Patient will improve FOTO to 55 points overall to demonstrate improvement in functional ability. Status at IE: FOTO score = 36 (an established functional score where 100 = no disability)                 Current: to be assessed next session.    8/16/2022, IN PROGRESS    PLAN  []  Upgrade activities as tolerated     [x]  Continue plan of care  []  Update interventions per flow sheet       []  Discharge due to:_  []  Other:_      Neojaved Gomez, PT 8/16/2022  9:35 AM    Future Appointments   Date Time Provider Mally Jamison   8/18/2022  9:30 AM Jaziel Hou, PT [facility]   8/23/2022  9:30 AM Jaziel Hou PT [facility]   8/25/2022  9:30 AM Mellissa Kim PTA [facility]

## 2022-08-16 NOTE — PROGRESS NOTES
[facility] at [address] [place] [address] Drive: [phone]   Fax: [phone]  Progress Note  Patient Name: Adonis Allison : 1945   Medical  Diagnosis: Pain in right knee [M25.561] Treatment Diagnosis: Right knee pain   Onset Date: chronic       Referral Source: Sue Posey MD Start of Care [facility]): 2022   Prior Hospitalization: See medical history Provider #: [id number]   Prior Level of Function: gardening   Comorbidities: Osteoporosis, OA, High blood pressure, Polymyositis, Prolapsed bladder   Medications: Verified on Patient Summary List      ===========================================================================================  Assessment / Summary of Care: Adonis Allison is a 77 y.o.  female who has been well motivated, consistent and diligent with land and aquatic PT and with HEP and as a result is making gradual but steady progress towards goals. Overall pain intensity is reduced, although it fluctuates significantly with activity level. Strength and functional activity tolerance are progressing. Patient will continue to benefit from skilled PT services to modify and progress therapeutic interventions, address functional mobility deficits, address ROM deficits, address strength deficits, analyze and address soft tissue restrictions, analyze and cue movement patterns, analyze and modify body mechanics/ergonomics, assess and modify postural abnormalities,  and instruct in home and community integration to attain remaining goals.    ===========================================================================================    Plan:Continue therapy per initial plan/protocol at a frequency of  2 x per week for 4 weeks    Progress Towards Goals:   Short Term Goals:  To be accomplished in 4 weeks:                 Patient will report compliance with HEP at least 1x/day to aid in rehabilitation program.                 Status at IE:Will provide HEP on second visit                 Current 8/11/22: Patient reports of compliant with HEP 1-2x/10. IN PROGRESS                    Patient will demonstrate AROM of 0-140 degrees to aid in completion of ADLs. Status at IE:0-130 degrees                 Current 8/4/22: Right knee AROM 0-138 degrees. IN PROGRESS      Long Term Goals: To be accomplished in 8 weeks:                 Patient will increase strength to 5/5 throughout B LEs to aid in completion of ADLs. Status at IE:4-/5      Current: improved to 4/5.     8/16/2022, IN PROGRESS                          Patient will report pain less than 1-2/10 average to aid in completion of ADLs. Status at IE: 2-8/10                 Current: Pain reduced to 3/10 intensity today. 8/9/2022, IN PROGRESS                    Patient will perform 10 pain free kettle bell squats with 7lbs with good form/technique to aid in completion of ADLs. Status at IE: Pain with STS transfer                 Current: Pain now minimal with single repetition sit to stand. 8/16/2022, IN PROGRESS     Patient will improve FOTO to 55 points overall to demonstrate improvement in functional ability. Status at IE: FOTO score = 36 (an established functional score where 100 = no disability)                 Current: to be assessed next session   8/16/2022, IN PROGRESS    ===========================================================================================  Subjective: \"Overall the therapy seems to be helping. I had a lubricant injection into my knee yesterday and it is a bit more swollen and stiff today. \"        Therapist Signature: Elsy Ball PT, DPT Date: 2/01/5964   Re-Certification: BEBE Time: 9:49 AM         [facility] at [address]                    Phone: [phone]   Fax: [phone]  .

## 2022-08-18 ENCOUNTER — HOSPITAL ENCOUNTER (OUTPATIENT)
Dept: PHYSICAL THERAPY | Age: 77
Discharge: HOME | End: 2022-08-18
Payer: MEDICARE

## 2022-08-18 PROCEDURE — 97530 THERAPEUTIC ACTIVITIES: CPT

## 2022-08-18 PROCEDURE — 97140 MANUAL THERAPY 1/> REGIONS: CPT

## 2022-08-18 PROCEDURE — 97110 THERAPEUTIC EXERCISES: CPT

## 2022-08-18 NOTE — PROGRESS NOTES
PT DAILY TREATMENT NOTE    Patient Name: Ilda Goldstein  Date:2022  : 1945  [x]  Patient  Verified  Payor: Jimmie Aguayo / Plan: [facility] HUMAN MEDICARE CHOICE PPO/PFFS / Product Type: Managed Care Medicare /    In time: 661  Out time: 5  Total Treatment Time (min): 43  Total Timed Codes (min): 43  1:1 Treatment Time ( [address] only): 42   Visit #: 9 of 16    Treatment Dx: Pain in right knee [M25.561]    SUBJECTIVE  Pain Level (0-10 scale): 4  Any medication changes, allergies to medications, adverse drug reactions, diagnosis change, or new procedure performed?: [x] No    [] Yes (see summary sheet for update)  Subjective functional status/changes:   [] No changes reported  \"The pain is worst in the hamstrings. The Manual Therapy the PTA did last time really helped. \"    OBJECTIVE    18 min Therapeutic Exercise:  [x] See flow sheet :   Rationale: increase ROM, increase strength and decrease pain to improve the patients ability to complete ADLs  ambulation safety and efficiency in order to improve patient's ability to safely ambulate at home for self care. 15 min Therapeutic Activity:  [x]  See flow sheet :   Rationale: increase ROM, increase strength and improve coordination  to improve the patients ability to Complete ADLS     10 min Manual Therapy:  hooklying/ right knee STM hamstrings and gastrocnemius/ PROM right knee flexion and extension    Rationale: decrease pain, increase ROM, increase tissue extensibility, decrease edema , decrease trigger points, and increase postural awareness to improve the patients ability to perform prior physical activities  The manual therapy interventions were performed at a separate and distinct time from the therapeutic activities interventions.     With   [] TE   [] TA   [] neuro   [] other: Patient Education: [x] Review HEP    [] Progressed/Changed HEP based on:   [] positioning   [] body mechanics   [] transfers   [] heat/ice application    [] other: Other Objective/Functional Measures: NA     Pain Level (0-10 scale) post treatment: 2    ASSESSMENT/Changes in Function: Patient tolerating gradual increase in exercise intensity without increased pain. Patient reports highest pain intensity is in distal hamstrings which responds well to soft tissue mobilization. Instructed patient in additional hamstring stretches for HEP. Patient responds well to treatment session. No adverse effects were noted from today's treatment session. Patient will continue to benefit from skilled PT services to modify and progress therapeutic interventions, address functional mobility deficits, address ROM deficits, address strength deficits, analyze and address soft tissue restrictions, analyze and cue movement patterns, analyze and modify body mechanics/ergonomics, assess and modify postural abnormalities,  and instruct in home and community integration to attain remaining goals. [x]  See Plan of Care  []  See progress note/recertification  []  See Discharge Summary         Progress towards goals / Updated goals:  Short Term Goals: To be accomplished in 4 weeks:                 Patient will report compliance with HEP at least 1x/day to aid in rehabilitation program.                 Status at IE:Will provide HEP on second visit                 Current 8/11/22: Patient reports of compliant with HEP 1-2x/10. IN PROGRESS                    Patient will demonstrate AROM of 0-140 degrees to aid in completion of ADLs. Status at IE:0-130 degrees                 Current 8/4/22: Right knee AROM 0-138 degrees. IN PROGRESS      Long Term Goals: To be accomplished in 8 weeks:                 Patient will increase strength to 5/5 throughout B LEs to aid in completion of ADLs.                  Status at IE:4-/5                      Current: improved to 4/5.     8/16/2022, IN PROGRESS                          Patient will report pain less than 1-2/10 average to aid in completion of ADLs. Status at IE: 2-8/10                 Current: Pain reduced to 3/10 intensity today. 8/9/2022, IN PROGRESS                    Patient will perform 10 pain free kettle bell squats with 7lbs with good form/technique to aid in completion of ADLs. Status at IE: Pain with STS transfer                 Current: Pain now minimal with single repetition sit to stand. 8/16/2022, IN PROGRESS     Patient will improve FOTO to 55 points overall to demonstrate improvement in functional ability.                  Status at IE: FOTO score = 36 (an established functional score where 100 = no disability)                 Current: 49   8/18/2022, IN PROGRESS       PLAN  []  Upgrade activities as tolerated     [x]  Continue plan of care  []  Update interventions per flow sheet       []  Discharge due to:_  []  Other:_      Alize Kaplan PT 8/18/2022  9:38 AM    Future Appointments   Date Time Provider Mally Jamison   8/23/2022  9:30 AM Corwin Arreguin, [facility] [place] [facility]   8/25/2022  9:30 AM HOOD Garcia [facility]

## 2022-08-23 ENCOUNTER — HOSPITAL ENCOUNTER (OUTPATIENT)
Dept: PHYSICAL THERAPY | Age: 77
Discharge: HOME | End: 2022-08-23
Payer: MEDICARE

## 2022-08-23 PROCEDURE — 97530 THERAPEUTIC ACTIVITIES: CPT

## 2022-08-23 PROCEDURE — 97112 NEUROMUSCULAR REEDUCATION: CPT

## 2022-08-23 PROCEDURE — 97110 THERAPEUTIC EXERCISES: CPT

## 2022-08-23 NOTE — PROGRESS NOTES
PT DAILY TREATMENT NOTE    Patient Name: Jose Armando Candelaria  Date:2022  : 1945  [x]  Patient  Verified  Payor: Star Luna / Plan: [facility] MEDICARE CHOICE PPO/PFFS / Product Type: Managed Care Medicare /    In time: 935  Out time: 1026  Total Treatment Time (min): 63  Total Timed Codes (min): 58  1:1 Treatment Time ([facility] only): 40   Visit #: 10 of 16    Treatment Dx: Pain in right knee [M25.561]    SUBJECTIVE  Pain Level (0-10 scale): 3-4  Any medication changes, allergies to medications, adverse drug reactions, diagnosis change, or new procedure performed?: [x] No    [] Yes (see summary sheet for update)  Subjective functional status/changes:   [] No changes reported  \"Overall, the knee is starting to feel better. \"    OBJECTIVE    15 min Therapeutic Exercise:  [x] See flow sheet :   Rationale: increase ROM, increase strength and decrease pain to improve the patients ability to complete ADLs  ambulation safety and efficiency in order to improve patient's ability to safely ambulate at home for self care. 15 min Therapeutic Activity:  [x]  See flow sheet :   Rationale: increase ROM, increase strength and improve coordination  to improve the patients ability to Complete ADLS     10 min Neuromuscular Re-education:  [x]  See flow sheet :   Rationale: improve coordination, improve balance and increase proprioception  to improve the patients ability to complete ADLS, and decrease falls risk    With   [] TE   [] TA   [] neuro   [] other: Patient Education: [x] Review HEP    [] Progressed/Changed HEP based on:   [] positioning   [] body mechanics   [] transfers   [] heat/ice application    [] other:      Other Objective/Functional Measures: NA     Pain Level (0-10 scale) post treatment: 2    ASSESSMENT/Changes in Function: Patient progressing in symptom reduction and activity tolerance.  Patient is progressing in level of independence with Aquatic Exercise Program. Patient responds well to treatment session. No adverse effects were noted from today's treatment session. Patient will continue to benefit from skilled PT services to modify and progress therapeutic interventions, address functional mobility deficits, address ROM deficits, address strength deficits, analyze and address soft tissue restrictions, analyze and cue movement patterns, analyze and modify body mechanics/ergonomics, assess and modify postural abnormalities,  and instruct in home and community integration to attain remaining goals. [x]  See Plan of Care  []  See progress note/recertification  []  See Discharge Summary         Progress towards goals / Updated goals:  Short Term Goals: To be accomplished in 4 weeks:                 Patient will report compliance with HEP at least 1x/day to aid in rehabilitation program.                 Status at IE:Will provide HEP on second visit                 Current 8/11/22: Patient reports of compliant with HEP 1-2x/10. IN PROGRESS                    Patient will demonstrate AROM of 0-140 degrees to aid in completion of ADLs. Status at IE:0-130 degrees                 Current 8/23/22: Right knee AROM 0-140 degrees. MET      Long Term Goals: To be accomplished in 8 weeks:                 Patient will increase strength to 5/5 throughout B LEs to aid in completion of ADLs. Status at IE:4-/5                      Current: improved to 4/5.     8/16/2022, IN PROGRESS                          Patient will report pain less than 1-2/10 average to aid in completion of ADLs. Status at IE: 2-8/10                 Current: Pain reduced to 3/10 intensity today. 8/9/2022, IN PROGRESS                    Patient will perform 10 pain free kettle bell squats with 7lbs with good form/technique to aid in completion of ADLs. Status at IE: Pain with STS transfer                 Current: Pain now minimal with single repetition sit to stand. 8/16/2022, IN PROGRESS     Patient will improve FOTO to 55 points overall to demonstrate improvement in functional ability.                  Status at IE: FOTO score = 36 (an established functional score where 100 = no disability)                 Current: 49   8/18/2022, IN PROGRESS       PLAN  []  Upgrade activities as tolerated     [x]  Continue plan of care  []  Update interventions per flow sheet       []  Discharge due to:_  []  Other:_      Jass Puente, PT 8/23/2022  9:29 AM    Future Appointments   Date Time Provider Mally Jamison   8/23/2022  9:30 AM Ashley Grant, PT [facility]   8/25/2022  9:30 AM Marshall Nance PTA [facility]   8/30/2022  8:00 AM Elijah Mejia, PT [facility]   9/1/2022  9:30 AM Ashley Grant PT [facility]   9/7/2022  3:30 PM Ashley Grant PT [facility]   9/8/2022  4:15 PM Stalin Ruiz, MARINA [facility]   9/12/2022  1:15 PM Elijah Mejia PT [facility]   9/15/2022  2:00 PM Brendon Ruiz [facility]

## 2022-08-25 ENCOUNTER — HOSPITAL ENCOUNTER (OUTPATIENT)
Dept: PHYSICAL THERAPY | Age: 77
Discharge: HOME | End: 2022-08-25
Payer: MEDICARE

## 2022-08-25 PROCEDURE — 97140 MANUAL THERAPY 1/> REGIONS: CPT

## 2022-08-25 PROCEDURE — 97530 THERAPEUTIC ACTIVITIES: CPT

## 2022-08-25 PROCEDURE — 97110 THERAPEUTIC EXERCISES: CPT

## 2022-08-25 PROCEDURE — 97016 VASOPNEUMATIC DEVICE THERAPY: CPT

## 2022-08-25 NOTE — PROGRESS NOTES
PT DAILY TREATMENT NOTE    Patient Name: Hulon Sacks  Date:2022  : 1945  [x]  Patient  Verified  Payor: Jeremy Torrez / Plan: [facility] MEDICARE CHOICE PPO/PFFS / Product Type: Managed Care Medicare /    In time:933  Out time:1030  Total Treatment Time (min): 57  Total Timed Codes (min): 47  1:1 Treatment Time (MC/BCBS only): 52   Visit #: 11 of 16    Treatment Dx: Pain in right knee [M25.561]    SUBJECTIVE  Pain Level (0-10 scale): 4  Any medication changes, allergies to medications, adverse drug reactions, diagnosis change, or new procedure performed?: [x] No    [] Yes (see summary sheet for update)  Subjective functional status/changes:   [] No changes reported  \"I feel tightness and pain from the back of my knee. \"    OBJECTIVE    Modalities Rationale:     decrease edema, decrease inflammation, and decrease pain to improve patient's ability to return PLOF   min [] Estim, type/location:                                      []  att     []  unatt     []  w/US     []  w/ice    []  w/heat    min []  Mechanical Traction: type/lbs                   []  pro   []  sup   []  int   []  cont    []  before manual    []  after manual    min []  Ultrasound, settings/location:      min []  Iontophoresis w/ dexamethasone, location:                                               []  take home patch       []  in clinic    min []  Ice     []  Heat    location/position:    10 min [x]  Vasopneumatic Device, press/temp: Right knee/ medium/ 34 degrees   If using vaso (only need to measure limb vaso being performed on)      pre-treatment girth : 33.5 cm      post-treatment girth : 33 cm      measured at (landmark location) :  mid-patella    min []  Other:    [x] Skin assessment post-treatment (if applicable):    [x]  intact    []  redness- no adverse reaction                  []redness - adverse reaction:          20 min Therapeutic Exercise:  [x] See flow sheet :   Rationale: increase ROM, increase strength, improve coordination, improve balance, and increase proprioception to improve the patients ability to return PLOF    17 min Therapeutic Activity:  [x]  See flow sheet :   Rationale: increase ROM, increase strength, improve coordination, improve balance, and increase proprioception  to improve the patients ability to perform ADL's without pain and symptom levels     10 min Manual Therapy:  hooklying/ right knee STM hamstrings and gastrocnemius/ PROM right knee flexion and extension    Rationale: decrease pain, increase ROM, increase tissue extensibility, decrease edema , decrease trigger points, and increase postural awareness to improve the patients ability to perform prior physical activities  The manual therapy interventions were performed at a separate and distinct time from the therapeutic activities interventions. With   [] TE   [] TA   [] neuro   [] other: Patient Education: [x] Review HEP    [] Progressed/Changed HEP based on:   [] positioning   [] body mechanics   [] transfers   [] heat/ice application    [] other:      Other Objective/Functional Measures:   CGA SLS and tandem --ankle instability      Pain Level (0-10 scale) post treatment: 5    ASSESSMENT/Changes in Function:   Patient is progressing well with therapeutic exercises. Patient continues to challenged with balancing at home. Added hip 3 ways, SLS, and tandem to improve with LE strength and balance. Patient demonstrated ankle instability with SLS and tandem stance. CGA with SLS and tandem to maintain balance. No adverse effects were noted from today's treatment session.     Patient will continue to benefit from skilled PT services to modify and progress therapeutic interventions, address functional mobility deficits, address ROM deficits, address strength deficits, analyze and address soft tissue restrictions, analyze and cue movement patterns, analyze and modify body mechanics/ergonomics, and assess and modify postural abnormalities to attain remaining goals. [x]  See Plan of Care  []  See progress note/recertification  []  See Discharge Summary         Progress towards goals / Updated goals:  Short Term Goals: To be accomplished in 4 weeks:                 Patient will report compliance with HEP at least 1x/day to aid in rehabilitation program.                 Status at IE:Will provide HEP on second visit                 Current 8/11/22: Patient reports of compliant with HEP 1-2x/10. IN PROGRESS                    Patient will demonstrate AROM of 0-140 degrees to aid in completion of ADLs. Status at IE:0-130 degrees                 Current 8/23/22: Right knee AROM 0-140 degrees. MET      Long Term Goals: To be accomplished in 8 weeks:                 Patient will increase strength to 5/5 throughout B LEs to aid in completion of ADLs. Status at IE:4-/5                      Current 8/25/22: Bilateral LE 4/5. IN PROGRESS                          Patient will report pain less than 1-2/10 average to aid in completion of ADLs. Status at IE: 2-8/10                 Current: Pain reduced to 3/10 intensity today. 8/9/2022, IN PROGRESS                    Patient will perform 10 pain free kettle bell squats with 7lbs with good form/technique to aid in completion of ADLs. Status at IE: Pain with STS transfer                 Current: Pain now minimal with single repetition sit to stand. 8/16/2022, IN PROGRESS     Patient will improve FOTO to 55 points overall to demonstrate improvement in functional ability.                  Status at IE: FOTO score = 36 (an established functional score where 100 = no disability)                 Current: 49   8/18/2022, IN PROGRESS       PLAN  []  Upgrade activities as tolerated     [x]  Continue plan of care  []  Update interventions per flow sheet       []  Discharge due to:_  []  Other:_      Andreas Sanchez PTA 8/25/2022  9:37 AM    Future Appointments   Date Time Provider Mally Yaquelin   8/30/2022  8:00 AM Claudene Cons, PT [facility]   9/1/2022  9:30 AM Walt Habermann, PT [facility]   9/7/2022  3:30 PM Walt Habermann, PT [facility]   9/8/2022  4:15 PM Clara Ruiz, PT [facility]   9/12/2022  1:15 PM Carlene Cons, PT [facility]   9/15/2022  2:00 PM Salas Ruiz [facility]

## 2022-08-30 ENCOUNTER — HOSPITAL ENCOUNTER (OUTPATIENT)
Dept: PHYSICAL THERAPY | Age: 77
Discharge: HOME | End: 2022-08-30
Payer: MEDICARE

## 2022-08-30 PROCEDURE — 97112 NEUROMUSCULAR REEDUCATION: CPT

## 2022-08-30 PROCEDURE — 97530 THERAPEUTIC ACTIVITIES: CPT

## 2022-08-30 PROCEDURE — 97110 THERAPEUTIC EXERCISES: CPT

## 2022-08-30 PROCEDURE — 97016 VASOPNEUMATIC DEVICE THERAPY: CPT

## 2022-08-30 NOTE — PROGRESS NOTES
PT DAILY TREATMENT NOTE - [facility]     Patient Name: Jami Harris  Date:2022  : 1945  [x]  Patient  Verified  Payor: Jessica Born / Plan: [facility] [employer] MEDICARE CHOICE PPO/PFFS / Product Type: Managed Care Medicare /    In time:806  Out time:906  Total Treatment Time (min): 60  Total Timed Codes (min): 50   1:1 Treatment Time [facility] only):38   Visit #: 12 of 16    Treatment Area: Pain in right knee [M25.561]    SUBJECTIVE  Pain Level (0-10 scale): 4  Any medication changes, allergies to medications, adverse drug reactions, diagnosis change, or new procedure performed?: [x] No    [] Yes (see summary sheet for update)  Subjective functional status/changes:   [] No changes reported    Patient reports that she is continuing to bonilla right knee pain and swelling.      OBJECTIVE    10 min Modalities: right knee         [x]  Vasopneumatic Device: temp 34 deg F         [] low pressure   [x] medium pressure   [] high pressure          - Pre-Vaso Girth (cm):  35.9          - Post-Vaso Girth (cm): 35.6          - Girth Measurement Location: mid-patella                 Rationale: to decrease edema and decrease pain to improve patient's ability to perform ADLs and improve QOL    20 min Therapeutic Exercise:  [x] See flow sheet :   Rationale: increase ROM, increase strength and decrease pain to improve the patients ability to complete ADLs    10 min Therapeutic Activity:  [x]  See flow sheet :   Rationale: increase ROM, increase strength and improve coordination  to improve the patients ability to complete ADLs     8 min Neuromuscular Re-education:  [x]  See flow sheet :   Rationale: improve coordination, improve balance and increase proprioception  to improve the patients ability to complete ADLs         With   [x] TE   [] TA   [] neuro   [] other: Patient Education: [x] Review HEP    [] Progressed/Changed HEP based on:   [] positioning   [] body mechanics   [] transfers   [] heat/ice application    [] other: Other Objective/Functional Measures: NA     Pain Level (0-10 scale) post treatment: 0    ASSESSMENT/Changes in Function: Patient responds well to treatment session. Encouraged patient to perform weight bearing exercise to promote increased load on bilateral LEs. Educated the patient on importance of developing LE muscle strength to reduce frailty and reduce fall risk. She demonstrated understanding. She was challenged with exercise prescribed. No adverse effects were noted from today's treatment session    Patient will continue to benefit from skilled PT services to modify and progress therapeutic interventions, address functional mobility deficits, address ROM deficits, address strength deficits, analyze and address soft tissue restrictions, analyze and cue movement patterns, analyze and modify body mechanics/ergonomics, assess and modify postural abnormalities, address imbalance and instruct in home and community integration to attain remaining goals. []  See Plan of Care  []  See progress note/recertification  []  See Discharge Summary         Progress towards goals / Updated goals:  Short Term Goals: To be accomplished in 4 weeks:                 Patient will report compliance with HEP at least 1x/day to aid in rehabilitation program.                 Status at IE:Will provide HEP on second visit                 Current Met, 8/30/2022                    Patient will demonstrate AROM of 0-140 degrees to aid in completion of ADLs. Status at IE:0-130 degrees                 Current 8/23/22: Right knee AROM 0-140 degrees. MET      Long Term Goals: To be accomplished in 8 weeks:                 Patient will increase strength to 5/5 throughout B LEs to aid in completion of ADLs. Status at IE:4-/5                      Current 8/25/22: Bilateral LE 4/5. IN PROGRESS                          Patient will report pain less than 1-2/10 average to aid in completion of ADLs. Status at IE: 2-8/10                 Current: Pain reduced to 3/10 intensity today. 8/9/2022, IN PROGRESS                    Patient will perform 10 pain free kettle bell squats with 7lbs with good form/technique to aid in completion of ADLs. Status at IE: Pain with STS transfer                 Current: Pain now minimal with single repetition sit to stand. 8/16/2022, IN PROGRESS     Patient will improve FOTO to 55 points overall to demonstrate improvement in functional ability.                  Status at IE: FOTO score = 36 (an established functional score where 100 = no disability)                 Current: 49   8/18/2022, IN PROGRESS    PLAN  []  Upgrade activities as tolerated     [x]  Continue plan of care  []  Update interventions per flow sheet       []  Discharge due to:_  []  Other:_      Cheyanne Alexander, PT, DPT 8/30/2022  8:14 AM    Future Appointments   Date Time Provider Mally Jamison   9/1/2022  9:30 AM Jaziel Hou, PT [facility]   9/7/2022  3:30 PM THE [facility] PT VICTORY 2 [facility]   9/8/2022  4:15 PM Dionne Ruiz, PT [facility]   9/12/2022  1:15 PM Chong Elizabeth, PT [facility]   9/15/2022  2:00 PM Manuel Ruiz [facility]

## 2022-09-01 ENCOUNTER — HOSPITAL ENCOUNTER (OUTPATIENT)
Dept: PHYSICAL THERAPY | Age: 77
Discharge: HOME | End: 2022-09-01
Payer: MEDICARE

## 2022-09-01 PROCEDURE — 97110 THERAPEUTIC EXERCISES: CPT

## 2022-09-01 PROCEDURE — 97112 NEUROMUSCULAR REEDUCATION: CPT

## 2022-09-01 PROCEDURE — 97530 THERAPEUTIC ACTIVITIES: CPT

## 2022-09-01 NOTE — PROGRESS NOTES
PT DAILY TREATMENT NOTE    Patient Name: Trace Zavala  Date:2022  : 1945  [x]  Patient  Verified  Payor: Killian Bazzi / Plan: [facility] [employer] MEDICARE CHOICE PPO/PFFS / Product Type: Managed Care Medicare /    In time:930  Out time:1040  Total Treatment Time (min): 70  Total Timed Codes (min): 60  1:1 Treatment Time ([facility]/BCBS only): 60   Visit #: 13 of 16    Treatment Dx: Pain in right knee [M25.561]    SUBJECTIVE  Pain Level (0-10 scale): 6  Any medication changes, allergies to medications, adverse drug reactions, diagnosis change, or new procedure performed?: [x] No    [] Yes (see summary sheet for update)  Subjective functional status/changes:   [] No changes reported  Patient reports of posterior lateral right knee pain today. OBJECTIVE    30 min Therapeutic Exercise:  [x] See flow sheet :   Rationale: increase ROM, increase strength, and decrease pain  to improve the patients ability to perform ADL's without pain and symptom levels    15 min Therapeutic Activity:  [x]  See flow sheet :   Rationale: increase ROM, increase strength, and decrease pain   to improve the patients ability to return PLOF     15 min Neuromuscular Re-education:  [x]  See flow sheet :   Rationale: increase ROM, increase strength, and decrease pain   to improve the patients ability to perform ADL's without pain and symptom levels          With   [] TE   [] TA   [] neuro   [] other: Patient Education: [x] Review HEP    [] Progressed/Changed HEP based on:   [] positioning   [] body mechanics   [] transfers   [] heat/ice application    [] other:      Other Objective/Functional Measures: NA     Pain Level (0-10 scale) post treatment: 5    ASSESSMENT/Changes in Function:   Patient demonstrated antalgic gait pattern today. Instructed to self pace with exercises to prevent increase pain with right knee. Patient required verbal and visual instruction to execute aquatic therapeutic exercises.  Patient education on continuing HEP to improve with LE strength and improve with gait with reduce pain. Patient continues to make steady progress toward goals and would benefit from continued skilled PT intervention to address remaining deficits outlined in goals below. Patient will continue to benefit from skilled PT services to modify and progress therapeutic interventions, address functional mobility deficits, address ROM deficits, address strength deficits, analyze and address soft tissue restrictions, analyze and cue movement patterns, analyze and modify body mechanics/ergonomics, and assess and modify postural abnormalities to attain remaining goals. [x]  See Plan of Care  []  See progress note/recertification  []  See Discharge Summary         Progress towards goals / Updated goals:  Short Term Goals: To be accomplished in 4 weeks:                 Patient will report compliance with HEP at least 1x/day to aid in rehabilitation program.                 Status at IE:Will provide HEP on second visit                 Current Met, 8/30/2022                    Patient will demonstrate AROM of 0-140 degrees to aid in completion of ADLs. Status at IE:0-130 degrees                 Current 8/23/22: Right knee AROM 0-140 degrees. MET      Long Term Goals: To be accomplished in 8 weeks:                 Patient will increase strength to 5/5 throughout B LEs to aid in completion of ADLs. Status at IE:4-/5                      Current 8/25/22: Bilateral LE 4/5. IN PROGRESS                          Patient will report pain less than 1-2/10 average to aid in completion of ADLs. Status at IE: 2-8/10                 Current 9/1/22: 6/10 pain with ADL's. Regression                         Patient will perform 10 pain free kettle bell squats with 7lbs with good form/technique to aid in completion of ADLs.                  Status at IE: Pain with STS transfer                 Current: Pain now minimal with single repetition sit to stand. 8/16/2022, IN PROGRESS     Patient will improve FOTO to 55 points overall to demonstrate improvement in functional ability.                  Status at IE: FOTO score = 36 (an established functional score where 100 = no disability)                 Current: 49   8/18/2022, IN PROGRESS       PLAN  []  Upgrade activities as tolerated     [x]  Continue plan of care  []  Update interventions per flow sheet       []  Discharge due to:_  []  Other:_      Ethan Blanco PTA 9/1/2022  9:05 AM    Future Appointments   Date Time Provider Mally Jamison   9/1/2022  9:30 AM HOOD [facility]   9/7/2022  3:30 PM [facility] PT VICTORY 2 [facility]   9/8/2022  4:15 PM Lana Ruiz [place] [facility]   9/12/2022  1:15 PM Sharon Roa PT [facility]   9/15/2022  2:00 PM Genesis Ruiz [facility]

## 2022-09-07 ENCOUNTER — HOSPITAL ENCOUNTER (OUTPATIENT)
Dept: PHYSICAL THERAPY | Age: 77
Discharge: HOME | End: 2022-09-07
Payer: MEDICARE

## 2022-09-07 PROCEDURE — 97112 NEUROMUSCULAR REEDUCATION: CPT

## 2022-09-07 PROCEDURE — 97016 VASOPNEUMATIC DEVICE THERAPY: CPT

## 2022-09-07 PROCEDURE — 97110 THERAPEUTIC EXERCISES: CPT

## 2022-09-07 PROCEDURE — 97530 THERAPEUTIC ACTIVITIES: CPT

## 2022-09-07 NOTE — PROGRESS NOTES
PT DAILY TREATMENT NOTE    Patient Name: Emeka Fowler  Date:2022  : 1945  [x]  Patient  Verified  Payor: Paul Robles / Plan: [facility] [employer] MEDICARE CHOICE PPO/PFFS / Product Type: Managed Care Medicare /    In time:3:30 pm  Out time:4:32 pm  Total Treatment Time (min): 62  Total Timed Codes (min): 50  1:1 Treatment Time ([facility]/BCBS only): 45   Visit #: 14 of 16    Treatment Dx: Pain in right knee [M25.561]    SUBJECTIVE  Pain Level (0-10 scale): 8  Any medication changes, allergies to medications, adverse drug reactions, diagnosis change, or new procedure performed?: [x] No    [] Yes (see summary sheet for update)  Subjective functional status/changes:   [] No changes reported  Pt states she was active walking and doing yardwork this weekend noting that her knee did ok during, but now notes increased knee pain afterwards. States her knee still remains swollen. OBJECTIVE    Modalities Rationale:     decrease edema, decrease inflammation, and decrease pain to improve patient's ability to improve ease of ambulation.    min [] Estim, type/location:                                      []  att     []  unatt     []  w/US     []  w/ice    []  w/heat    min []  Mechanical Traction: type/lbs                   []  pro   []  sup   []  int   []  cont    []  before manual    []  after manual    min []  Ultrasound, settings/location:      min []  Iontophoresis w/ dexamethasone, location:                                               []  take home patch       []  in clinic    min []  Ice     []  Heat    location/position:    12 min [x]  Vasopneumatic Device, press/temp: low temp, medium pressure  10+2 min setup   If using vaso (only need to measure limb vaso being performed on)      pre-treatment girth : 36.3 cm      post-treatment girth :  35.2 cm      measured at (landmark location) :  mid patella    min []  Other:    [] Skin assessment post-treatment (if applicable):    []  intact    []  redness- no adverse reaction                  []redness - adverse reaction:     27 min Therapeutic Exercise:  [x] See flow sheet :   Rationale: increase ROM and increase strength to improve the patients ability to restore PLOF    10 min Therapeutic Activity:  [x]  See flow sheet :   Rationale: increase ROM, increase strength, and improve coordination  to improve the patients ability to complete transfers and ADLs without external assist     8 min Neuromuscular Re-education:  [x]  See flow sheet :   Rationale: improve coordination, improve balance, and increase proprioception  to improve the patients ability to improve ease of Adls. With   [] TE   [] TA   [] neuro   [x] other: Patient Education: [x] Review HEP    [] Progressed/Changed HEP based on:   [] positioning   [] body mechanics   [] transfers   [] heat/ice application    [x] other: Pt brought her written MRI report requesting explanation. Educated her on what the written descriptions in her MRI mean. Advised her to discuss further with her physician during her next follow up. Educated regarding age related changes as well as chronic pain. Other Objective/Functional Measures:      Pain Level (0-10 scale) post treatment: 4    ASSESSMENT/Changes in Function: Patient tolerated treatment session fairly well today. She had difficulty with concentric and eccentric portions of step ups though gradually improved technique with repetition and reduced pain post treatment. Patient continues to make gradual progress toward goals and would benefit from continued skilled PT intervention to address remaining deficits outlined in goals below. No adverse response to treatment noted.     Patient will continue to benefit from skilled PT services to modify and progress therapeutic interventions, address functional mobility deficits, address ROM deficits, address strength deficits, analyze and address soft tissue restrictions, analyze and cue movement patterns, analyze and modify body mechanics/ergonomics, address imbalance/dizziness, and instruct in home and community integration to attain remaining goals. [x]  See Plan of Care  []  See progress note/recertification  []  See Discharge Summary         Progress towards goals / Updated goals:  Short Term Goals: To be accomplished in 4 weeks:                 Patient will report compliance with HEP at least 1x/day to aid in rehabilitation program.                 Status at IE:Will provide HEP on second visit                 Current Met, 8/30/2022                    Patient will demonstrate AROM of 0-140 degrees to aid in completion of ADLs. Status at IE:0-130 degrees                 Current 8/23/22: Right knee AROM 0-140 degrees. MET      Long Term Goals: To be accomplished in 8 weeks:                 Patient will increase strength to 5/5 throughout B LEs to aid in completion of ADLs. Status at IE:4-/5                      Current 8/25/22: Bilateral LE 4/5. IN PROGRESS                          Patient will report pain less than 1-2/10 average to aid in completion of ADLs. Status at IE: 2-8/10                 Current: Pain reduced to 3/10 intensity today. 8/9/2022, IN PROGRESS                    Patient will perform 10 pain free kettle bell squats with 7lbs with good form/technique to aid in completion of ADLs. Status at IE: Pain with STS transfer                 Current: Pain now minimal with single repetition sit to stand. 8/16/2022, IN PROGRESS     Patient will improve FOTO to 55 points overall to demonstrate improvement in functional ability.                  Status at IE: FOTO score = 36 (an established functional score where 100 = no disability)                 Current: 49   8/18/2022, IN PROGRESS    PLAN  [x]  Upgrade activities as tolerated     [x]  Continue plan of care  []  Update interventions per flow sheet       []  Discharge due to:_  []  Other:_      Inetta Corn Malu Zavala, PT, DPT, ATC, CMTPT-DN 9/7/2022  3:30 PM    Future Appointments   Date Time Provider Mally Jamison   9/8/2022  4:15 PM Ronnie Craft [facility]   9/12/2022  1:15 PM Marsha Hugo [facility]   9/15/2022  2:00 PM Aminah Ruiz [facility]

## 2022-09-08 ENCOUNTER — HOSPITAL ENCOUNTER (OUTPATIENT)
Dept: PHYSICAL THERAPY | Age: 77
Discharge: HOME | End: 2022-09-08
Payer: MEDICARE

## 2022-09-08 PROCEDURE — 97110 THERAPEUTIC EXERCISES: CPT

## 2022-09-08 PROCEDURE — 97112 NEUROMUSCULAR REEDUCATION: CPT

## 2022-09-08 PROCEDURE — 97530 THERAPEUTIC ACTIVITIES: CPT

## 2022-09-08 NOTE — PROGRESS NOTES
PT DAILY TREATMENT NOTE    Patient Name: Ilda Goldstein  Date:2022  : 1945  [x]  Patient  Verified  Payor: Jimmie Aguayo / Plan: [facility] HUMAN MEDICARE CHOICE PPO/PFFS / Product Type: Managed Care Medicare /    In time: 529  Out time: 522  Total Treatment Time (min): 67  Total Timed Codes (min): 67  1:1 Treatment Time (MC only): 54   Visit #: 15 of 16    Treatment Dx: Pain in right knee [M25.561]    SUBJECTIVE  Pain Level (0-10 scale): 6  Any medication changes, allergies to medications, adverse drug reactions, diagnosis change, or new procedure performed?: [x] No    [] Yes (see summary sheet for update)  Subjective functional status/changes:   [] No changes reported  \"My main goal in PT was to get exercise programs tailored to me that I can continue on my own. I think I have a good land and pool program to use now. \"    OBJECTIVE    25 min Therapeutic Exercise:  [x] See flow sheet :   Rationale: increase ROM, increase strength and decrease pain to improve the patients ability to complete ADLs  ambulation safety and efficiency in order to improve patient's ability to safely ambulate at home for self care.         15 min Therapeutic Activity:  [x]  See flow sheet :   Rationale: increase ROM, increase strength and improve coordination  to improve the patients ability to Complete ADLS     15 min Neuromuscular Re-education:  [x]  See flow sheet :   Rationale: improve coordination, improve balance and increase proprioception  to improve the patients ability to complete ADLS, and decrease falls risk    With   [] TE   [] TA   [] neuro   [] other: Patient Education: [x] Review HEP    [] Progressed/Changed HEP based on:   [] positioning   [] body mechanics   [] transfers   [] heat/ice application    [] other:      Other Objective/Functional Measures: NA     Pain Level (0-10 scale) post treatment: 5    ASSESSMENT/Changes in Function: Patient has been instructed in and provided written copies of both land and aquatic home exercise program which she can now continue independently on a long term basis. Patient demonstrates independence with both program. Patient continues to report high pain levels which are temporarily reduced with performance of exercise but patient has not yet obtained lasting relief or significant functional improvement due to severity of pain symptoms. Patient responds well to treatment session. No adverse effects were noted from today's treatment session. []  See Plan of Care  []  See progress note/recertification  [x]  See Discharge Summary         Progress towards goals / Updated goals:  Short Term Goals: To be accomplished in 4 weeks:                 Patient will report compliance with HEP at least 1x/day to aid in rehabilitation program.                 Status at IE:Will provide HEP on second visit                 Current Met, 8/30/2022                    Patient will demonstrate AROM of 0-140 degrees to aid in completion of ADLs. Status at IE:0-130 degrees                 Current 8/23/22: Right knee AROM 0-140 degrees. MET      Long Term Goals: To be accomplished in 8 weeks:                 Patient will increase strength to 5/5 throughout B LEs to aid in completion of ADLs. Status at IE:4/5                      Current 9/8/22: Bilateral LE 4/5. IN PROGRESS                          Patient will report pain less than 1-2/10 average to aid in completion of ADLs. Status at IE: 2-8/10                 Current: Pain still increases to 6/10 range with activity        9/8/2022, LIMITED PROGRESS                    Patient will perform 10 pain free kettle bell squats with 7lbs with good form/technique to aid in completion of ADLs. Status at IE: Pain with STS transfer                 Current: Pain now minimal with single repetition sit to stand.       8/16/2022, IN PROGRESS     Patient will improve FOTO to 55 points overall to demonstrate improvement in functional ability.                  Status at IE: FOTO score = 36 (an established functional score where 100 = no disability)                 Current: 38   9/8/2022, LIMITED PROGRESS    PLAN  []  Upgrade activities as tolerated     []  Continue plan of care  []  Update interventions per flow sheet       [x]  Discharge due to:_independent in HEP  []  Other:_      Jose Manuel Huggins PT 9/8/2022  4:51 PM    Future Appointments   Date Time Provider Mally Jamison   9/12/2022  1:15 PM Faustino Melenedz, PT [facility]   9/15/2022  2:00 PM Nikolas Ruiz [facility]

## 2022-09-08 NOTE — PROGRESS NOTES
[facility] at [address] [place] [address] Drive: [phone]   Fax: [phone]  Discharge Summary  Patient Name: Akira Phoenix : 1945   Medical  Diagnosis: Pain in right knee [M25.561] Treatment Diagnosis: Right knee pain   Onset Date: chronic       Referral Source: Basia Rahman MD Start of Care [facility]): 2022   Prior Hospitalization: See medical history Provider #: [id number]   Prior Level of Function: gardening   Comorbidities: Osteoporosis, OA, High blood pressure, Polymyositis, Prolapsed bladder   Medications: Verified on Patient Summary List      ===========================================================================================  Assessment / Summary of Care: Akira Phoenix is a 77 y.o.   female who has been instructed in and provided written copies of both land and aquatic home exercise program which she can now continue independently on a long term basis. Patient demonstrates independence with both program. Patient continues to report high pain levels which are temporarily reduced with performance of exercise but patient has not yet obtained lasting relief or significant functional improvement due to severity of pain symptoms.     ===========================================================================================    Plan: Discharge to independent land and aquatic HEP. Progress Towards Goals:     Short Term Goals: To be accomplished in 4 weeks:                 Patient will report compliance with HEP at least 1x/day to aid in rehabilitation program.                 Status at IE:Will provide HEP on second visit                 Current Met, 2022                    Patient will demonstrate AROM of 0-140 degrees to aid in completion of ADLs. Status at IE:0-130 degrees                 Current 22: Right knee AROM 0-140 degrees. MET      Long Term Goals:  To be accomplished in 8 weeks:                 Patient will increase strength to 5/5 throughout B LEs to aid in completion of ADLs. Status at IE:4/5                      Current 9/8/22: Bilateral LE 4/5. IN PROGRESS                          Patient will report pain less than 1-2/10 average to aid in completion of ADLs. Status at IE: 2-8/10                 Current: Pain still increases to 6/10 range with activity        9/8/2022, LIMITED PROGRESS                    Patient will perform 10 pain free kettle bell squats with 7lbs with good form/technique to aid in completion of ADLs. Status at IE: Pain with STS transfer                 Current: Pain now minimal with single repetition sit to stand. 8/16/2022, IN PROGRESS     Patient will improve FOTO to 55 points overall to demonstrate improvement in functional ability.                  Status at IE: FOTO score = 36 (an established functional score where 100 = no disability)                 Current: 38   9/8/2022, LIMITED PROGRESS    ===========================================================================================    Therapist Signature: Howie Malik PT, DPGEORGES Date: 9/8/2022     Time: 6:02 PM

## 2022-09-08 NOTE — PROGRESS NOTES
Physical Therapy Discharge Instructions    [facility] at [address] [place] [address]  Phone: [phone]   Fax: [phone]      Patient: Marcie Sharpe  : 1945    Continue Land and Aquatic Home Exercise Program three times per week each. Continue with    [x] Ice  as needed      [] Heat           Follow up with MD:     [] Upon completion of therapy     [x] As needed      Recommendations:     []   Return to activity with home program    [x]   Return to activity with the following modifications:       []Post Rehab Program    [x]Join Independent aquatic program     []Return to/join local gym      Additional Comments: Please contact clinic at above phone number if you have any questions regarding Home Exercise Program or self care instructions.       Mauro Arshad, PT 2022 6:05 PM

## 2022-09-12 ENCOUNTER — APPOINTMENT (OUTPATIENT)
Dept: PHYSICAL THERAPY | Age: 77
End: 2022-09-12
Payer: MEDICARE

## 2022-09-15 ENCOUNTER — APPOINTMENT (OUTPATIENT)
Dept: PHYSICAL THERAPY | Age: 77
End: 2022-09-15
Payer: MEDICARE

## 2022-11-01 ENCOUNTER — HOSPITAL ENCOUNTER (OUTPATIENT)
Dept: PHYSICAL THERAPY | Age: 77
Discharge: HOME | End: 2022-11-01
Payer: MEDICARE

## 2022-11-01 ENCOUNTER — APPOINTMENT (OUTPATIENT)
Dept: PHYSICAL THERAPY | Age: 77
End: 2022-11-01

## 2022-11-01 ENCOUNTER — TELEPHONE (OUTPATIENT)
Dept: PHYSICAL THERAPY | Age: 77
End: 2022-11-01

## 2022-11-01 PROCEDURE — 97161 PT EVAL LOW COMPLEX 20 MIN: CPT

## 2022-11-01 NOTE — PROGRESS NOTES
[facility] [address] [place] [address]  Phone: [phone]   Fax: [phone]    Plan of Care/ Statement of Necessity for Physical Therapy Services     Patient name: Steve Garcia Start of Care: 2022   Referral source: Andres Tucker MD : 1945    Medical Diagnosis: Pain in right knee [M25.561]  Other abnormalities of gait and mobility [R26.89]   Onset Date:Chronic    Treatment Diagnosis: Right knee pain. Other abnormalities of gait   Prior Hospitalization: see medical history Provider#: [id number]   Medications: Verified on Patient summary List    Comorbidities: Osteoporosis, OA, High blood pressure, Polymyositis, Prolapsed bladder   Prior Level of Function: Gardening    The Plan of Care and following information is based on the information from the initial evaluation. Assessment/ key information: Patient presents with c/o right knee pain and reduced balance with a hx falls over the last year. She has decreased postural strength and awareness. She has reduced bilateral LE and abdominal strength. She has reduced balance and is a risk for falls. Patient ambulates with independence demonstrating an antalgic gait pattern as she has reduced stance time on the right and decreased step length on the left. Patient presentation is consistent with gait abnormalities secondary to generalized deconditioning and complicated by right knee OA. Patient will benefit from skilled PT services to modify and progress therapeutic interventions, address functional mobility deficits, address ROM deficits, address strength deficits, analyze and cue movement patterns, analyze and modify body mechanics/ergonomics, assess and modify postural abnormalities, address imbalance/dizziness and instruct in home and community integration to attain her goals.      Evaluation Complexity History HIGH Complexity :3+ comorbidities / personal factors will impact the outcome/ POC ; Examination LOW Complexity : 1-2 Standardized tests and measures addressing body structure, function, activity limitation and / or participation in recreation  ;Presentation LOW Complexity : Stable, uncomplicated  ;Clinical Decision Making MEDIUM Complexity : FOTO score of 26-74  Overall Complexity Rating: LOW   Problem List: pain affecting function, decrease ROM, decrease strength, impaired gait/ balance, decrease ADL/ functional abilitiies, decrease activity tolerance, decrease flexibility/ joint mobility, and decrease transfer abilities   Treatment Plan may include any combination of the following: Therapeutic exercise, Neuromuscular reeducation, Manual therapy, Therapeutic activity, Self care/home management, Aquatic therapy, and Gait training  Patient / Family readiness to learn indicated by: asking questions, trying to perform skills, and interest  Persons(s) to be included in education: patient (P)  Barriers to Learning/Limitations: None  Measures taken if barriers to learning: NA  Patient Goal (s): Stable balance  Patient Self Reported Health Status: fair  Rehabilitation Potential: good    Short Term Goals: To be accomplished in 2 weeks:   Patient will report compliance with HEP at least 1x/day to aid in rehabilitation program.   Status at IE: Will provided HEP on second visit   Current:Same as IE     Patient will decrease TUG to 8 seconds to display MCID and progression to decreased falls risk. Status at IE: 10.8   Current:Same  as IE       Long Term Goals: To be accomplished in 8 weeks:   Patient will increase strength to 5/5 throughout bilateral LEs to aid in return recreational activities and ADLs. Status at IE: 4-/5   Current:Same as IE     Patient will improve Tinetti score to 24 to demonstrate decreased risk for falls. Status at IE: 17/28   Current:Same as IE     Patient will ambulate 1000ft on level surface with LRAD and normalized gait.    Status at IE:deviates to the left and right   Current:Same as IE     Patient will improve FOTO to 52 points overall to demonstrate improvement in functional ability. Status at IE:FOTO score = 48 (an established functional score where 100 = no disability)   Current: Same as IE    Frequency / Duration: Patient to be seen 2 times per week for 8 weeks. Patient/ Caregiver education and instruction: Diagnosis, prognosis, self care, activity modification, and exercises   [x]  Plan of care has been reviewed with PTA    Certification Period: 11/1/2022 - 12/31/2022    Viri Ch PT, DPT 11/1/2022 1:40 PM  _____________________________________________________________________  I certify that the above Therapy Services are being furnished while the patient is under my care. I agree with the treatment plan and certify that this therapy is necessary.     Physician's Signature:____________Date:_________TIME:________                                      Josetaylor Benitez MD    ** Signature, Date and Time must be completed for valid certification **    Please sign and return to   [facility] at [facility], [place], [address]  Phone: [phone]   Fax: [phone]

## 2022-11-01 NOTE — PROGRESS NOTES
PT DAILY TREATMENT NOTE/NEURO EVAL 10-18    Patient Name: Neftali Downs  Date:2022  : 1945  [x]  Patient  Verified  Payor: Shanel Marino / Plan: [facility] MEDICARE CHOICE PPO/PFFS / Product Type: Managed Care Medicare /    In time:1100  Out time:1145  Total Treatment Time (min): 45  Visit #: 1 of 16    Medicare/BCBS Only   Total Timed Codes (min):  0 1:1 Treatment Time:  45     Treatment Area: Pain in right knee [M25.561]  Other abnormalities of gait and mobility [R26.89]    SUBJECTIVE  Pain Level (0-10 scale): 0  []constant [x]intermittent []improving []worsening []no change since onset    Any medication changes, allergies to medications, adverse drug reactions, diagnosis change, or new procedure performed?: [x] No    [] Yes (see summary sheet for update)  Subjective functional status/changes:       Patient presents with c/o right knee pain and reduced balance with a hx falls over the last year. Patient describes pain as dull, cramp. Pain is worse in PM. Denies numbness/tingling. Denies popping/clicking. Aggravating factors:movement, activity. Alleviating factors: rest. Denies red flags: SOB, chest pain, dizziness/lightheadedness, blurred/double vision, HA, chills/fevers, night sweats, change in bowel/bladder control, abdominal pain, difficulty swallowing, slurred speech, unexplained weight gain/loss, nausea, vomiting. PMHx: Osteoporosis, OA, High blood pressure, Polymyositis, Prolapsed bladder Surgical Hx: . Social Hx: Lives with spouse in a single story home, work status: Retired. PLOF: Gardening. OBJECTIVE/EXAMINATION    45 min [x]Eval                  []Re-Eval         With   [x] TE   [] TA   [] neuro   [] other: Patient Education: [x] Review HEP    [] Progressed/Changed HEP based on:   [] positioning   [] body mechanics   [] transfers   [] heat/ice application    [] other:        Physical Therapy Evaluation - Neurologic    Posture:  Forward head and rounded shoulders    Gait: [] Normal    [x] Abnormal    Device:    none    Describe: Patient ambulates with independence demonstrating an antalgic gait pattern as she has reduced stance time on the right and decreased step length on the left. ROM/Strength        AROM             Strength (1-5)  Hip Left Right Left Right   Flexion   4- 3+   Extension   4- 4-   Abduction   4- 3+   Adduction   4 4   ER       IR       Knee Left Right Left Right   Extension 142 138 4 4-   Flexion 0 0 4 4   Ankle       Plantar Flexion   4- 4-   Dorsiflexion   4 4   Inversion   4- 4-   Eversion    4- 4-           *Postural strength including abdominals 4-/5                                    Sensation: WNLs    Reflexes: [] Not Tested   Left Right   Knee Jerk (L4) 3+ 3+   Ankle Jerk (SI) 3+ 3+     Balance/ Equilibrium:                  Sitting Balance: Static:  [x] Good    [] Fair    [] Poor     Dynamic:   [x] Good    [] Fair    [] Poor        Standing Balance: Static:   [] Good    [x] Fair    [] Poor     Dynamic:   [] Good    [] Fair    [x] Poor        Protective Extension:  [] Present    [x] Delayed    [] Absent        Single Leg Stance:         Eyes Open  Eyes Closed   L LOB L NT   R LOB R NT    * requires UEA to perform SLS    Other:       Impaired Judgement: [] Y    [x] N      Impaired Vision:  [] Y    [x] N      Safety Awareness Deficits  [] Y    [x] N      Impaired Hearing  [] Y    [x] N      Able to Express Needs [x] Y    [] N    Optional Tests:          Tinetti (28pt scale): 17/28    Other test /comments:  TUG: 10.8 seconds    30 second sit to stand: 7 reps (10-15 is average)       Pain Level (0-10 scale) post treatment: 0    ASSESSMENT/Changes in Function:     Patient presents with c/o right knee pain and reduced balance with a hx falls over the last year. She has decreased postural strength and awareness. She has reduced bilateral LE and abdominal strength. She has reduced balance and is a risk for falls.  Patient ambulates with independence demonstrating an antalgic gait pattern as she has reduced stance time on the right and decreased step length on the left. Patient presentation is consistent with gait abnormalities secondary to generalized deconditioning and complicated by right knee OA. Patient will benefit from skilled PT services to modify and progress therapeutic interventions, address functional mobility deficits, address ROM deficits, address strength deficits, analyze and cue movement patterns, analyze and modify body mechanics/ergonomics, assess and modify postural abnormalities, address imbalance/dizziness and instruct in home and community integration to attain her goals.      [x]  See Plan of Care  []  See progress note/recertification  []  See Discharge Summary         Progress towards goals / Updated goals:  See POC    PLAN  []  Upgrade activities as tolerated     [x]  Continue plan of care  []  Update interventions per flow sheet       []  Discharge due to:_  []  Other:_      Rose Browning PT, DPT 11/1/2022  10:58 AM

## 2022-12-16 ENCOUNTER — APPOINTMENT (OUTPATIENT)
Dept: PHYSICAL THERAPY | Age: 77
End: 2022-12-16
Payer: MEDICARE

## 2022-12-16 ENCOUNTER — HOSPITAL ENCOUNTER (OUTPATIENT)
Dept: PHYSICAL THERAPY | Age: 77
Discharge: HOME | End: 2022-12-16
Payer: MEDICARE

## 2022-12-16 PROCEDURE — 97161 PT EVAL LOW COMPLEX 20 MIN: CPT

## 2022-12-16 NOTE — PROGRESS NOTES
[facility] at [address] [place] [address], [address] [place] [address]  Phone: [phone]   Fax: [phone]    Plan of Care/ Statement of Necessity for Physical Therapy Services     Patient name: Nicole Keen Start of Care: 2022   Referral source: Cecelia Hogan MD : 1945    Medical Diagnosis: Pain in right knee [M25.561]  Other abnormalities of gait and mobility [R26.89]   Onset Date:22    Treatment Diagnosis: gait instability, right knee pain   Prior Hospitalization: see medical history Provider#: [id number]   Medications: Verified on Patient summary List    Comorbidities: polymyositis including weekly subcutaneous infusions, osteoporosis, hypertension, history of multiple falls, chronic right knee pain s/p fall in 2022, prolapsed bladder with prior surgery  Substance Use: [] tobacco use, [] alcohol use, [] other:   Prior Level of Function:   [x] Unrestricted with functional activities and ADLs  [x] No assistive device  [] active lifestyle, [x] moderately active lifestyle, [] sedentary lifestyle  Patients Goals:  \"to have better balance and get stronger so I'm more certain with my walking\"    The Plan of Care and following information is based on the information from the initial evaluation. Assessment/ key information: Patient is a pleasant 69 y.o. female presenting to skilled PT c/o decreased balance x 1 year as well as right knee pain s/p fall in 2022. Patient reports known balance issues, including about 6 falls within the last year. Patient reports landing directly on her right knee in April s/p fall. Patient reports attending skilled PT for land/aquatic exerices earlier this year for about 2 months, which she found really helpful. Patient reports continued compliance with her land based HEP, which she feels as helped with her strength. Patient states she hasn't had the opportunity to do pool exercises on her own.   Patient reports she is still having intermittent pain along the medial portion of her right knee, including when walking. Patient also reports \"minor\" issues with stair negotiations, stating she can do a step-through pattern but she's \"very deliberate to take it slow and easy with my good posture. \"  Patient also c/o difficulty with sit <> stands, but states she's gotten better at this since continuing her HEP. Patient also c/o LE fatigue towards the end of the day, stating she spends most of her day walking around her home. \"I have polymyositis, so that fatigue may be related to that, but the exercises have even helped with that. \"     Today's examination findings revealed abnormal gait mechanics, impaired balance during trunk AROM movements, as well as significant weakness of bilateral glute meds and glutes max's. Patient has a significant PMH of 6 falls in the last year, and remains at a higher risk of falls based upon her significant gluteal weakness. Patient would benefit from skilled PT services to modify and progress therapeutic interventions, address functional mobility deficits, address ROM deficits, address strength deficits, analyze and address soft tissue restrictions, analyze and cue movement patterns, analyze and modify body mechanics/ergonomics, and assess and modify postural abnormalities to attain remaining goals. Patient did well with today's evaluation. Patient was educated in [facility] and all questions were answered. Of note, patient had to leave today's initial evaluation early today to be able to pick someone up at another location.   Evaluation Complexity History MEDIUM  Complexity : 1-2 comorbidities / personal factors will impact the outcome/ POC ; Examination LOW Complexity : 1-2 Standardized tests and measures addressing body structure, function, activity limitation and / or participation in recreation  ;Presentation LOW Complexity : Stable, uncomplicated  ;Clinical Decision Making MEDIUM Complexity : FOTO score of 26-74  Overall Complexity Rating: LOW   Problem List: pain affecting function, decrease strength, impaired gait/ balance, decrease ADL/ functional abilitiies, decrease activity tolerance, decrease flexibility/ joint mobility, and decrease transfer abilities   Treatment Plan may include any combination of the following: Therapeutic exercise, Neuromuscular reeducation, Manual therapy, Therapeutic activity, Self care/home management, Electric stim unattended , Vasopneumatic device, Aquatic therapy, Gait training, Ultrasound, Mechanical traction, Electric stim attended, Needle insertion w/o injection (1 or 2 muscles), and Needle insertion w/o injection (3+ muscles)  Patient / Family readiness to learn indicated by: asking questions, trying to perform skills, and interest  Persons(s) to be included in education: patient (P)  Barriers to Learning/Limitations: None  Measures taken if barriers to learning: n/a  Patient Self Reported Health Status: fair  Rehabilitation Potential: good    Short Term Goals:    to be accomplished within 8 treatments:     Patient will be compliant and independent with prescribed HEP(s) in order to assist in maximizing therapeutic gains and improving overall function. Eval: HEP to be issued and reviewed with patient within 1-2 visits     Patient will be able to demonstrate functional trunk AROM in all planes without any LOB and good weight shift in order to reduce risk of falls during activities such as bending forward to  something on the floor. Eval: Trunk AROM: WFL in all planes, asymptomatic though one posterior LOB when returning from flexion to neutral position requiring stepping strategy     Patient will be able to perform at least 12-17 reps of sit <> stands during 30\" STS test to demonstrate improved LE strength and stability during this functional activity, thus reducing the patients risk of falls.   Eval: unable to test today as patient had to leave eval early, will test on second visit      Long Term Goals:     to be accomplished within 16 treatments:     Patient will score at least 51 points on FOTO in order to maximize function and promote patient satisfaction with overall outcome. (Adam Ratel is an established functional score where a score of 100 = no disability)  Eval: 40     Patient will demonstrate at least 4/5 strength bilaterally in order to improve safety with sit <> stands, stair negotiations, and walking. Eval: Grossly 5/5 bilaterally except bilateral hip flexion 4/5, bilateral hip abduction 2-/5, and bilateral hip extension 3+/5     Patient will be able to perform SLS (bilaterally) for at least 30 seconds in order to show a reduction in the risk of falls. Eval: unable to test today as patient had to leave eval early, will test on second visit    Frequency / Duration: Patient to be seen 2 times per week for 16 treatments. Patient/ Caregiver education and instruction: Diagnosis, prognosis, other answered patient's questions   [x]  Plan of care has been reviewed with PTA    Certification Period: 12/16/22 - 3/16/23    Ky Torres, PT 12/16/2022 9:14 AM  _____________________________________________________________________  I certify that the above Therapy Services are being furnished while the patient is under my care. I agree with the treatment plan and certify that this therapy is necessary.     Physician's Signature:____________Date:_________TIME:________                                      Madyson Maynard MD    ** Signature, Date and Time must be completed for valid certification **    Please sign and return to [facility] at [address]  Phone: [phone]   Fax: [phone]

## 2022-12-19 ENCOUNTER — TELEPHONE (OUTPATIENT)
Dept: PHYSICAL THERAPY | Age: 77
End: 2022-12-19

## 2022-12-20 ENCOUNTER — TELEPHONE (OUTPATIENT)
Dept: PHYSICAL THERAPY | Age: 77
End: 2022-12-20

## 2022-12-21 ENCOUNTER — HOSPITAL ENCOUNTER (OUTPATIENT)
Dept: PHYSICAL THERAPY | Age: 77
Discharge: HOME | End: 2022-12-21
Payer: MEDICARE

## 2022-12-21 PROCEDURE — 97530 THERAPEUTIC ACTIVITIES: CPT

## 2022-12-21 PROCEDURE — 97112 NEUROMUSCULAR REEDUCATION: CPT

## 2022-12-21 PROCEDURE — 97110 THERAPEUTIC EXERCISES: CPT

## 2022-12-21 NOTE — PROGRESS NOTES
PT DAILY TREATMENT NOTE    Patient Name: Peterson Mejia  Date:2022  : 1945  [x]  Patient  Verified  Payor: Maty Can / Plan: [facility] MEDICARE CHOICE PPO/PFFS / Product Type: Managed Care Medicare /    In time: 3241  Out time: 1550  Total Treatment Time (min): 62  Total Timed Codes (min): 57  1:1 Treatment Time ( only): 39   Visit #: 2 of 16    Treatment Dx: Pain in right knee [M25.561]  Other abnormalities of gait and mobility [R26.89]    SUBJECTIVE  Pain Level (0-10 scale): right knee 4  Any medication changes, allergies to medications, adverse drug reactions, diagnosis change, or new procedure performed?: [x] No    [] Yes (see summary sheet for update)  Subjective functional status/changes:   [] No changes reported  \"I am frustrated with how poor my balance is. \"    OBJECTIVE    15 min Therapeutic Exercise:  [x] See flow sheet :   Rationale: increase ROM, increase strength and decrease pain to improve the patients ability to complete ADLs  ambulation safety and efficiency in order to improve patient's ability to safely ambulate at home for self care.         15 min Therapeutic Activity:  [x]  See flow sheet :   Rationale: increase ROM, increase strength and improve coordination  to improve the patients ability to Complete ADLS     15 min Neuromuscular Re-education:  [x]  See flow sheet :   Rationale: improve coordination, improve balance and increase proprioception  to improve the patients ability to complete ADLS, and decrease falls risk    With   [] TE   [] TA   [] neuro   [] other: Patient Education: [x] Review HEP    [] Progressed/Changed HEP based on:   [] positioning   [] body mechanics   [] transfers   [] heat/ice application    [] other:      Other Objective/Functional Measures: NA     Pain Level (0-10 scale) post treatment: right knee 3    ASSESSMENT/Changes in Function: Patient instructed in initial HEP and provided written copy of exercises and resistance band for home use. Patient responds well to treatment session. No adverse effects were noted from today's treatment session. Patient will continue to benefit from skilled PT services to modify and progress therapeutic interventions, address functional mobility deficits, address ROM deficits, address strength deficits, analyze and address soft tissue restrictions, analyze and cue movement patterns, analyze and modify body mechanics/ergonomics, assess and modify postural abnormalities,  and instruct in home and community integration to attain remaining goals. [x]  See Plan of Care  []  See progress note/recertification  []  See Discharge Summary         Progress towards goals / Updated goals:  Short Term Goals:    to be accomplished within 8 treatments:     Patient will be compliant and independent with prescribed HEP(s) in order to assist in maximizing therapeutic gains and improving overall function. Eval: HEP to be issued and reviewed with patient within 1-2 visits     Patient will be able to demonstrate functional trunk AROM in all planes without any LOB and good weight shift in order to reduce risk of falls during activities such as bending forward to  something on the floor. Eval: Trunk AROM: WFL in all planes, asymptomatic though one posterior LOB when returning from flexion to neutral position requiring stepping strategy     Patient will be able to perform at least 12-17 reps of sit <> stands during 30\" STS test to demonstrate improved LE strength and stability during this functional activity, thus reducing the patients risk of falls. Eval: unable to test today as patient had to leave eval early, will test on second visit   Current: 6 repetitions. 12/21/2022, in progress     Long Term Goals:     to be accomplished within 16 treatments:     Patient will score at least 51 points on FOTO in order to maximize function and promote patient satisfaction with overall outcome.   (FOTO is an established functional score where a score of 100 = no disability)  Eval: 40     Patient will demonstrate at least 4/5 strength bilaterally in order to improve safety with sit <> stands, stair negotiations, and walking. Eval: Grossly 5/5 bilaterally except bilateral hip flexion 4/5, bilateral hip abduction 2-/5, and bilateral hip extension 3+/5     Patient will be able to perform SLS (bilaterally) for at least 30 seconds in order to show a reduction in the risk of falls. Eval: unable to test today as patient had to leave eval early, will test on second visit   Current: SLS eyes open, left 3 seconds, right 3 seconds. 12/21/2022, in progress    PLAN  []  Upgrade activities as tolerated     [x]  Continue plan of care  []  Update interventions per flow sheet       []  Discharge due to:_  []  Other:_      Ronald Reyes, PT 12/21/2022  2:56 PM    No future appointments.

## 2022-12-23 ENCOUNTER — HOSPITAL ENCOUNTER (OUTPATIENT)
Dept: PHYSICAL THERAPY | Age: 77
Discharge: HOME | End: 2022-12-23
Payer: MEDICARE

## 2022-12-23 PROCEDURE — 97530 THERAPEUTIC ACTIVITIES: CPT

## 2022-12-23 PROCEDURE — 97112 NEUROMUSCULAR REEDUCATION: CPT

## 2022-12-23 PROCEDURE — 97110 THERAPEUTIC EXERCISES: CPT

## 2022-12-23 NOTE — PROGRESS NOTES
PT DAILY TREATMENT NOTE     Patient Name: Princess Vidales  Date:2022  : 1945  [x]  Patient  Verified  Payor: Dalton Lloyd / Plan: [facility] MEDICARE CHOICE PPO/PFFS / Product Type: Managed Care Medicare /    In time:158  Out time:244  Total Treatment Time (min): 46  Visit #: 3 of 16    Medicare/BCBS Only   Total Timed Codes (min):  46 1:1 Treatment Time:  46       Treatment Area: Pain in right knee [M25.561]  Other abnormalities of gait and mobility [R26.89]    SUBJECTIVE  Pain Level (0-10 scale): 4/10  Any medication changes, allergies to medications, adverse drug reactions, diagnosis change, or new procedure performed?: [x] No    [] Yes (see summary sheet for update)  Subjective functional status/changes:   [] No changes reported  \"I have pain everyday\"     OBJECTIVE      19 min Therapeutic Exercise:  [x] See flow sheet :   Rationale: increase ROM and increase strength to improve the patients ability to perform ADLs    12 min Therapeutic Activity:  [x]  See flow sheet :   Rationale: increase ROM, increase strength, and improve coordination  to improve the patients ability to perform ADLs     15 min Neuromuscular Re-education:  [x]  See flow sheet :   Rationale: increase ROM, increase strength, and improve coordination and balance to improve the patients ability to perform ADLs      With   [x] TE   [] TA   [] neuro   [] other: Patient Education: [x] Review HEP    [] Progressed/Changed HEP based on:   [] positioning   [] body mechanics   [] transfers   [] heat/ice application    [] other:      Other Objective/Functional Measures:   Initiated step ups fwd and lateral with 6\" step    Initiated S/L hip abduction     Pain Level (0-10 scale) post treatment: 4/10    ASSESSMENT/Changes in Function: Patient actively participates in therapy and tolerates progressions well with no c/o increased pain       Patient will continue to benefit from skilled PT services to modify and progress therapeutic interventions, address functional mobility deficits, address ROM deficits, address strength deficits, analyze and address soft tissue restrictions, analyze and cue movement patterns, analyze and modify body mechanics/ergonomics, and assess and modify postural abnormalities to attain remaining goals. []  See Plan of Care  []  See progress note/recertification  []  See Discharge Summary         Progress towards goals / Updated goals:  Short Term Goals:    to be accomplished within 8 treatments:     Patient will be compliant and independent with prescribed HEP(s) in order to assist in maximizing therapeutic gains and improving overall function. Eval: HEP to be issued and reviewed with patient within 1-2 visits     Patient will be able to demonstrate functional trunk AROM in all planes without any LOB and good weight shift in order to reduce risk of falls during activities such as bending forward to  something on the floor. Eval: Trunk AROM: WFL in all planes, asymptomatic though one posterior LOB when returning from flexion to neutral position requiring stepping strategy     Patient will be able to perform at least 12-17 reps of sit <> stands during 30\" STS test to demonstrate improved LE strength and stability during this functional activity, thus reducing the patients risk of falls. Eval: unable to test today as patient had to leave eval early, will test on second visit   Current: 6 repetitions. 12/21/2022, in progress     Long Term Goals:     to be accomplished within 16 treatments:     Patient will score at least 51 points on FOTO in order to maximize function and promote patient satisfaction with overall outcome. (Colton Frankfort is an established functional score where a score of 100 = no disability)  Eval: 40     Patient will demonstrate at least 4/5 strength bilaterally in order to improve safety with sit <> stands, stair negotiations, and walking.   Eval: Grossly 5/5 bilaterally except bilateral hip flexion 4/5, bilateral hip abduction 2-/5, and bilateral hip extension 3+/5     Patient will be able to perform SLS (bilaterally) for at least 30 seconds in order to show a reduction in the risk of falls. Eval: unable to test today as patient had to leave eval early, will test on second visit              Current: SLS eyes open, left 3 seconds, right 3 seconds.      12/21/2022, in progress    PLAN  [x]  Upgrade activities as tolerated     [x]  Continue plan of care  []  Update interventions per flow sheet       []  Discharge due to:_  []  Other:_      David Watts, PTA 12/23/2022  2:04 PM    Future Appointments   Date Time Provider Mally Jamison   12/27/2022 10:15 AM Antelmo Ruiz, PT [facility]   1/4/2023  9:30 AM Mahsa Fossa, PT [facility]   1/6/2023 10:00 AM Mahsa Fossa, PT [facility]   1/9/2023  2:00 PM Debemanuela Jett, PT [facility]   1/11/2023  2:30 PM Mahsa Fossa, PT [facility]   1/16/2023  2:00 PM Mahsa Fossa, PT [facility]   1/18/2023 10:00 AM Mahsa Fossa, PT [facility]   1/23/2023 10:00 AM Mahsa Fossa, PT [facility]   1/25/2023 10:00 AM Mahsa Fossa, PT [facility]

## 2022-12-27 ENCOUNTER — HOSPITAL ENCOUNTER (OUTPATIENT)
Dept: PHYSICAL THERAPY | Age: 77
Discharge: HOME | End: 2022-12-27
Payer: MEDICARE

## 2022-12-27 PROCEDURE — 97110 THERAPEUTIC EXERCISES: CPT

## 2022-12-27 PROCEDURE — 97112 NEUROMUSCULAR REEDUCATION: CPT

## 2022-12-27 PROCEDURE — 97530 THERAPEUTIC ACTIVITIES: CPT

## 2022-12-27 NOTE — PROGRESS NOTES
PT DAILY TREATMENT NOTE    Patient Name: Kimmie Guerra  Date:2022  : 1945  [x]  Patient  Verified  Payor: Preet Laser / Plan: [facility] [employer] MEDICARE CHOICE PPO/PFFS / Product Type: Managed Care Medicare /    In time:   Out time:   Total Treatment Time (min): 55  Total Timed Codes (min): 50  1:1 Treatment Time ([facility] only): 50   Visit #: 4 of 16    Treatment Dx: Pain in right knee [M25.561]  Other abnormalities of gait and mobility [R26.89]    SUBJECTIVE  Pain Level (0-10 scale): 6  Any medication changes, allergies to medications, adverse drug reactions, diagnosis change, or new procedure performed?: [x] No    [] Yes (see summary sheet for update)  Subjective functional status/changes:   [] No changes reported  \"My mid and upper back has been real painful past couple days. \"    OBJECTIVE    20 min Therapeutic Exercise:  [x] See flow sheet :   Rationale: increase ROM, increase strength and decrease pain to improve the patients ability to complete ADLs  ambulation safety and efficiency in order to improve patient's ability to safely ambulate at home for self care.         15 min Therapeutic Activity:  [x]  See flow sheet :   Rationale: increase ROM, increase strength and improve coordination  to improve the patients ability to Complete ADLS     15 min Neuromuscular Re-education:  [x]  See flow sheet :   Rationale: improve coordination, improve balance and increase proprioception  to improve the patients ability to complete ADLS, and decrease falls risk    With   [] TE   [] TA   [] neuro   [] other: Patient Education: [x] Review HEP    [] Progressed/Changed HEP based on:   [] positioning   [] body mechanics   [] transfers   [] heat/ice application    [] other:      Other Objective/Functional Measures: NA     Pain Level (0-10 scale) post treatment: 3    ASSESSMENT/Changes in Function: Patient instructed in seated Swiss Ball trunk flexion stretches to add to HEP to address recent onset increased spinal pain. Patient responds well to treatment session. No adverse effects were noted from today's treatment session. Patient will continue to benefit from skilled PT services to modify and progress therapeutic interventions, address functional mobility deficits, address ROM deficits, address strength deficits, analyze and address soft tissue restrictions, analyze and cue movement patterns, analyze and modify body mechanics/ergonomics, assess and modify postural abnormalities,  and instruct in home and community integration to attain remaining goals. [x]  See Plan of Care  []  See progress note/recertification  []  See Discharge Summary         Progress towards goals / Updated goals:  Short Term Goals:    to be accomplished within 8 treatments:     Patient will be compliant and independent with prescribed HEP(s) in order to assist in maximizing therapeutic gains and improving overall function. Eval: HEP to be issued and reviewed with patient within 1-2 visits     Patient will be able to demonstrate functional trunk AROM in all planes without any LOB and good weight shift in order to reduce risk of falls during activities such as bending forward to  something on the floor. Eval: Trunk AROM: WFL in all planes, asymptomatic though one posterior LOB when returning from flexion to neutral position requiring stepping strategy     Patient will be able to perform at least 12-17 reps of sit <> stands during 30\" STS test to demonstrate improved LE strength and stability during this functional activity, thus reducing the patients risk of falls. Eval: unable to test today as patient had to leave eval early, will test on second visit   Current: 6 repetitions. 12/21/2022, in progress     Long Term Goals:     to be accomplished within 16 treatments:     Patient will score at least 51 points on FOTO in order to maximize function and promote patient satisfaction with overall outcome.   (FOTO is an established functional score where a score of 100 = no disability)  Eval: 40     Patient will demonstrate at least 4/5 strength bilaterally in order to improve safety with sit <> stands, stair negotiations, and walking. Eval: Grossly 5/5 bilaterally except bilateral hip flexion 4/5, bilateral hip abduction 2-/5, and bilateral hip extension 3+/5     Patient will be able to perform SLS (bilaterally) for at least 30 seconds in order to show a reduction in the risk of falls. Eval: unable to test today as patient had to leave eval early, will test on second visit              Current: SLS eyes open, left 3 seconds, right 3 seconds.      12/21/2022, in progress    PLAN  []  Upgrade activities as tolerated     [x]  Continue plan of care  []  Update interventions per flow sheet       []  Discharge due to:_  []  Other:_      Anthony Ramirez PT 12/27/2022  10:31 AM    Future Appointments   Date Time Provider Mally Jamison   1/4/2023  9:30 AM Winda Derrick, PT [facility]   1/6/2023 10:00 AM Winda Derrick, PT [facility]   1/9/2023  2:00 PM Stu , PT [facility]   1/11/2023  2:30 PM Winda Derrick, PT [facility]   1/16/2023  2:00 PM Winda Derrick, PT [facility]   1/18/2023 10:00 AM Winda Derrick, PT [facility]   1/23/2023 10:00 AM Winda Derrick, PT [facility]   1/25/2023 10:00 AM Winda Derrick, PT [facility]

## 2023-01-04 ENCOUNTER — HOSPITAL ENCOUNTER (OUTPATIENT)
Dept: PHYSICAL THERAPY | Age: 78
Discharge: HOME | End: 2023-01-04
Payer: MEDICARE

## 2023-01-04 PROCEDURE — 97112 NEUROMUSCULAR REEDUCATION: CPT

## 2023-01-04 PROCEDURE — 97530 THERAPEUTIC ACTIVITIES: CPT

## 2023-01-04 PROCEDURE — 97110 THERAPEUTIC EXERCISES: CPT

## 2023-01-04 NOTE — PROGRESS NOTES
PT DAILY TREATMENT NOTE    Patient Name: Jacinto Downs  Date:2023  : 1945  [x]  Patient  Verified  Payor: Dian Yoon / Plan: [facility] HUMAN MEDICARE CHOICE PPO/PFFS / Product Type: Managed Care Medicare /    In time:9:48 AM  Out time:10:28 AM  Total Treatment Time (min): 40  Total Timed Codes (min): 40  1:1 Treatment Time ([facility] only): 40   Visit #: 5 of 16    Treatment Dx: Pain in right knee [M25.561]  Other abnormalities of gait and mobility [R26.89]    SUBJECTIVE  Pain Level (0-10 scale): 6  Any medication changes, allergies to medications, adverse drug reactions, diagnosis change, or new procedure performed?: [x] No    [] Yes (see summary sheet for update)  Subjective functional status/changes:   [] No changes reported  Patient reports pain is in her upper back today. OBJECTIVE    20 min Therapeutic Exercise:  [x] See flow sheet :   Rationale: increase ROM, increase strength, and improve coordination to improve the patients ability to increase ease with ADLs    10 min Therapeutic Activity:  [x]  See flow sheet :   Rationale: increase strength and improve coordination  to improve the patients ability to improve ADL ease     10 min Neuromuscular Re-education:  [x]  See flow sheet :   Rationale: increase strength, improve coordination, improve balance, and increase proprioception  to improve the patients ability to improve balance and reduce fall risk               With   [] TE   [] TA   [] neuro   [] other: Patient Education: [x] Review HEP    [] Progressed/Changed HEP based on:   [] positioning   [] body mechanics   [] transfers   [] heat/ice application    [] other:      Other Objective/Functional Measures:      Pain Level (0-10 scale) post treatment: 6    ASSESSMENT/Changes in Function:   Patient continues to require CGA with dynamic balance activities as she presents with unsteadiness though no LOB.  Pain levels continue to vary in number and main region of pain (today was upper spine). Will continue to focus on increasing bilateral LE strength and improving static and dynamic balance. Consider adding cone taps on step in future visits. Patient will continue to benefit from skilled PT services to modify and progress therapeutic interventions, address functional mobility deficits, address ROM deficits, address strength deficits, analyze and address soft tissue restrictions, analyze and cue movement patterns, analyze and modify body mechanics/ergonomics, assess and modify postural abnormalities, and instruct in home and community integration to attain remaining goals. [x]  See Plan of Care  []  See progress note/recertification  []  See Discharge Summary         Progress towards goals / Updated goals:  Short Term Goals:    to be accomplished within 8 treatments:     Patient will be compliant and independent with prescribed HEP(s) in order to assist in maximizing therapeutic gains and improving overall function. Eval: HEP to be issued and reviewed with patient within 1-2 visits  Current: MET 1/4/23 pt reports compliance      Patient will be able to demonstrate functional trunk AROM in all planes without any LOB and good weight shift in order to reduce risk of falls during activities such as bending forward to  something on the floor. Eval: Trunk AROM: WFL in all planes, asymptomatic though one posterior LOB when returning from flexion to neutral position requiring stepping strategy     Patient will be able to perform at least 12-17 reps of sit <> stands during 30\" STS test to demonstrate improved LE strength and stability during this functional activity, thus reducing the patients risk of falls. Eval: unable to test today as patient had to leave eval early, will test on second visit   Current: 6 repetitions.  12/21/2022, in progress     Long Term Goals:     to be accomplished within 16 treatments:     Patient will score at least 51 points on FOTO in order to maximize function and promote patient satisfaction with overall outcome. (Alice Rich is an established functional score where a score of 100 = no disability)  Eval: 40     Patient will demonstrate at least 4/5 strength bilaterally in order to improve safety with sit <> stands, stair negotiations, and walking. Eval: Grossly 5/5 bilaterally except bilateral hip flexion 4/5, bilateral hip abduction 2-/5, and bilateral hip extension 3+/5     Patient will be able to perform SLS (bilaterally) for at least 30 seconds in order to show a reduction in the risk of falls. Eval: unable to test today as patient had to leave eval early, will test on second visit              Current: SLS eyes open, left 3 seconds, right 3 seconds.      12/21/2022, in progress       PLAN  []  Upgrade activities as tolerated     [x]  Continue plan of care  []  Update interventions per flow sheet       []  Discharge due to:_  []  Other:_      Vonnie Able 1/4/2023  8:42 AM    Future Appointments   Date Time Provider Mally Jamison   1/4/2023  9:30 AM Rita Tammie [facility]   1/6/2023 10:00 AM Faylene Likes, PT [facility]   1/9/2023  2:00 PM Ronnie Villalobos [facility]   1/11/2023  2:30 PM Faylene Likes, PT [facility]   1/16/2023  2:00 PM Faylene Likes, PT [facility]   1/18/2023 10:00 AM Faylene Likes, PT [facility]   1/23/2023 10:00 AM Faylene Likes, PT [facility]   1/25/2023 10:00 AM Faylene Likes, PT [facility]

## 2023-01-05 NOTE — PROGRESS NOTES
[facility] at [address]: [phone]   Fax: [phone]  Discharge Summary  Patient name: Ad Lucas Start of Care: 2022   Referral source: Deborah Zhang MD : 1945                  Medical Diagnosis: Pain in right knee [M25.561]  Other abnormalities of gait and mobility [R26.89]    Onset Date:Chronic                  Treatment Diagnosis: Right knee pain. Other abnormalities of gait   Prior Hospitalization: see medical history Provider#: [id number]   Medications: Verified on Patient summary List      ===========================================================================================  Assessment / Summary of Care: Ad Lucas is a 69 y.o.   female who attended a PT evaluation on 2022 to initiate PT to improve balance and safety in mobility. [employer] did not return calls requesting scheduling of treatment sessions after evaluation, so she has been discharged from PT for this episode of care.  Patient is to contact provider if she wishes to restart PT in the future.     ===========================================================================================    Plan: Await contact from patient if she wishes to re-start PT.      ===========================================================================================    Therapist Signature: Meera Haley PT, DPT Date: 2023     Time: 3:32 PM

## 2023-01-06 ENCOUNTER — HOSPITAL ENCOUNTER (OUTPATIENT)
Dept: PHYSICAL THERAPY | Age: 78
Discharge: HOME | End: 2023-01-06
Payer: MEDICARE

## 2023-01-06 PROCEDURE — 97112 NEUROMUSCULAR REEDUCATION: CPT

## 2023-01-06 PROCEDURE — 97530 THERAPEUTIC ACTIVITIES: CPT

## 2023-01-06 PROCEDURE — 97110 THERAPEUTIC EXERCISES: CPT

## 2023-01-06 NOTE — PROGRESS NOTES
PT DAILY TREATMENT NOTE - [facility]     Patient Name: Malena Eason  Date:2023  : 1945  [x]  Patient  Verified  Payor: Nereyda Ruelas / Plan: [facility] [employer] MEDICARE CHOICE PPO/PFFS / Product Type: Managed Care Medicare /    In time:1000  Out time:1038  Total Treatment Time (min): 38  Total Timed Codes (min): 38   1:1 Treatment Time ( only): 45   Visit #: 6 of 16    Treatment Area: Pain in right knee [M25.561]  Other abnormalities of gait and mobility [R26.89]    SUBJECTIVE  Pain Level (0-10 scale): 6  Any medication changes, allergies to medications, adverse drug reactions, diagnosis change, or new procedure performed?: [x] No    [] Yes (see summary sheet for update)  Subjective functional status/changes:   [] No changes reported    Patient reports pain in right knee and left hip. OBJECTIVE    10 min Therapeutic Exercise:  [x] See flow sheet :   Rationale: increase ROM, increase strength and decrease pain to improve the patients ability to complete ADLs    8 min Therapeutic Activity:  [x]  See flow sheet :   Rationale: increase ROM, increase strength and improve coordination  to improve the patients ability to complete ADLs     20 min Neuromuscular Re-education:  [x]  See flow sheet :   Rationale: improve coordination, improve balance and increase proprioception  to improve the patients ability to complete ADLs          With   [] TE   [] TA   [] neuro   [] other: Patient Education: [x] Review HEP    [] Progressed/Changed HEP based on:   [] positioning   [] body mechanics   [] transfers   [] heat/ice application    [] other:      Other Objective/Functional Measures: NA     Pain Level (0-10 scale) post treatment: 4    ASSESSMENT/Changes in Function:     Patient responds well to treatment session. Provided close supervision for safety with balance activities. Patient had poor recall of exercise parameters, provided several cues to stay on task during balance exercise.  Will advance exercise as tolerated. No adverse effects were noted from today's treatment session    Patient will continue to benefit from skilled PT services to modify and progress therapeutic interventions, address functional mobility deficits, address ROM deficits, address strength deficits, analyze and address soft tissue restrictions, analyze and cue movement patterns, analyze and modify body mechanics/ergonomics, assess and modify postural abnormalities, address imbalance/dizziness and instruct in home and community integration to attain remaining goals. []  See Plan of Care  []  See progress note/recertification  []  See Discharge Summary         Progress towards goals / Updated goals:  Short Term Goals:    to be accomplished within 8 treatments:     Patient will be compliant and independent with prescribed HEP(s) in order to assist in maximizing therapeutic gains and improving overall function. Eval: HEP to be issued and reviewed with patient within 1-2 visits  Current: MET 1/4/23 pt reports compliance      Patient will be able to demonstrate functional trunk AROM in all planes without any LOB and good weight shift in order to reduce risk of falls during activities such as bending forward to  something on the floor. Eval: Trunk AROM: WFL in all planes, asymptomatic though one posterior LOB when returning from flexion to neutral position requiring stepping strategy     Patient will be able to perform at least 12-17 reps of sit <> stands during 30\" STS test to demonstrate improved LE strength and stability during this functional activity, thus reducing the patients risk of falls. Eval: unable to test today as patient had to leave eval early, will test on second visit   Current: 6 repetitions.  12/21/2022, in progress     Long Term Goals:     to be accomplished within 16 treatments:     Patient will score at least 51 points on FOTO in order to maximize function and promote patient satisfaction with overall outcome. (Re Locus is an established functional score where a score of 100 = no disability)  Eval: 40     Patient will demonstrate at least 4/5 strength bilaterally in order to improve safety with sit <> stands, stair negotiations, and walking. Eval: Grossly 5/5 bilaterally except bilateral hip flexion 4/5, bilateral hip abduction 2-/5, and bilateral hip extension 3+/5     Patient will be able to perform SLS (bilaterally) for at least 30 seconds in order to show a reduction in the risk of falls. Eval: unable to test today as patient had to leave eval early, will test on second visit              Current: SLS eyes open, left 3 seconds, right 3 seconds.      12/21/2022, in progress    PLAN  []  Upgrade activities as tolerated     []  Continue plan of care  []  Update interventions per flow sheet       []  Discharge due to:_  []  Other:_      Андрей Gong, PT, DPT 1/6/2023  10:13 AM    Future Appointments   Date Time Provider Mally Jamison   1/9/2023  2:00 PM Aruna Alford PT [facility]   1/11/2023  2:30 PM Lay Tonya, PT [facility]   1/16/2023  2:00 PM Lay Tonya, PT [facility]   1/18/2023 10:00 AM Lay Tonya, PT [facility]   1/23/2023 10:00 AM Lay Tonya, PT [facility]   1/25/2023 10:00 AM Lay Tonya, PT [facility]

## 2023-01-09 ENCOUNTER — HOSPITAL ENCOUNTER (OUTPATIENT)
Dept: PHYSICAL THERAPY | Age: 78
Discharge: HOME | End: 2023-01-09
Payer: MEDICARE

## 2023-01-09 PROCEDURE — 97112 NEUROMUSCULAR REEDUCATION: CPT

## 2023-01-09 PROCEDURE — 97110 THERAPEUTIC EXERCISES: CPT

## 2023-01-09 NOTE — PROGRESS NOTES
PT DAILY TREATMENT NOTE    Patient Name: Marium Womack  Date:2023  : 1945  [x]  Patient  Verified  Payor: Deshawn Graves / Plan: [facility] [employer] MEDICARE CHOICE PPO/PFFS / Product Type: Managed Care Medicare /    In time: 9091  Out time: 418  Total Treatment Time (min): 49  Total Timed Codes (min): 49  1:1 Treatment Time ( [address] only): 30   Visit #: 10 of 16    Treatment Dx: Pain in right knee [M25.561]  Other abnormalities of gait and mobility [R26.89]    SUBJECTIVE  Pain Level (0-10 scale): 2  Any medication changes, allergies to medications, adverse drug reactions, diagnosis change, or new procedure performed?: [x] No    [] Yes (see summary sheet for update)  Subjective functional status/changes:   [] No changes reported  \"I had more leg pain after last session. I think it was increasing the resistance of the clamshell exercise that aggravated my leg. \"    OBJECTIVE    15 min Therapeutic Exercise:  [x] See flow sheet :   Rationale: increase ROM, increase strength and decrease pain to improve the patients ability to complete ADLs  ambulation safety and efficiency in order to improve patient's ability to safely ambulate at home for self care. 15 min Neuromuscular Re-education:  [x]  See flow sheet :   Rationale: improve coordination, improve balance and increase proprioception  to improve the patients ability to complete ADLS, and decrease falls risk    With   [] TE   [] TA   [] neuro   [] other: Patient Education: [x] Review HEP    [] Progressed/Changed HEP based on:   [] positioning   [] body mechanics   [] transfers   [] heat/ice application    [] other:      Other Objective/Functional Measures: NA     Pain Level (0-10 scale) post treatment: 2    ASSESSMENT/Changes in Function: Patient reporting increased right LE pain after last session. Offered to reduce resistance of exercise to accommodate patient report of increased pain last session.  Patient stated she wanted to work at higher resistance to gain strength. Instructed patient to limit range of exercise to better reduce risk of exacerbation. Tolerated exercises without report of increased pain at end of session. Patient responds well to treatment session. No adverse effects were noted from today's treatment session. Patient will continue to benefit from skilled PT services to modify and progress therapeutic interventions, address functional mobility deficits, address ROM deficits, address strength deficits, analyze and address soft tissue restrictions, analyze and cue movement patterns, analyze and modify body mechanics/ergonomics, assess and modify postural abnormalities,  and instruct in home and community integration to attain remaining goals. [x]  See Plan of Care  []  See progress note/recertification  []  See Discharge Summary         Progress towards goals / Updated goals:  Short Term Goals:    to be accomplished within 8 treatments:     Patient will be compliant and independent with prescribed HEP(s) in order to assist in maximizing therapeutic gains and improving overall function. Eval: HEP to be issued and reviewed with patient within 1-2 visits  Current: MET 1/4/23 pt reports compliance      Patient will be able to demonstrate functional trunk AROM in all planes without any LOB and good weight shift in order to reduce risk of falls during activities such as bending forward to  something on the floor. Eval: Trunk AROM: WFL in all planes, asymptomatic though one posterior LOB when returning from flexion to neutral position requiring stepping strategy     Patient will be able to perform at least 12-17 reps of sit <> stands during 30\" STS test to demonstrate improved LE strength and stability during this functional activity, thus reducing the patients risk of falls. Eval: unable to test today as patient had to leave eval early, will test on second visit   Current: 6 repetitions.  12/21/2022, in progress     Long Term Goals:     to be accomplished within 16 treatments:     Patient will score at least 51 points on FOTO in order to maximize function and promote patient satisfaction with overall outcome. (Temoa Iglesia is an established functional score where a score of 100 = no disability)  Eval: 40     Patient will demonstrate at least 4/5 strength bilaterally in order to improve safety with sit <> stands, stair negotiations, and walking. Eval: Grossly 5/5 bilaterally except bilateral hip flexion 4/5, bilateral hip abduction 2-/5, and bilateral hip extension 3+/5     Patient will be able to perform SLS (bilaterally) for at least 30 seconds in order to show a reduction in the risk of falls. Eval: unable to test today as patient had to leave eval early, will test on second visit              Current: SLS eyes open, left 3 seconds, right 3 seconds.      12/21/2022, in progress    PLAN  []  Upgrade activities as tolerated     [x]  Continue plan of care  []  Update interventions per flow sheet       []  Discharge due to:_  []  Other:_      Meera Haley PT 1/9/2023  2:04 PM    Future Appointments   Date Time Provider Mally Jamison   1/11/2023  2:30 PM Aditi Stevenson, PT [facility]   1/16/2023  2:00 PM Aditi Stevenson, PT [facility]   1/18/2023 10:00 AM Aditi Stevenson, PT [facility]   1/23/2023 10:00 AM Aditi Stevenson, PT [facility]   1/25/2023 10:00 AM Aditi Stevenson, PT [facility]

## 2023-01-11 ENCOUNTER — TELEPHONE (OUTPATIENT)
Dept: PHYSICAL THERAPY | Age: 78
End: 2023-01-11

## 2023-01-11 ENCOUNTER — APPOINTMENT (OUTPATIENT)
Dept: PHYSICAL THERAPY | Age: 78
End: 2023-01-11
Payer: MEDICARE

## 2023-01-13 ENCOUNTER — HOSPITAL ENCOUNTER (OUTPATIENT)
Dept: PHYSICAL THERAPY | Age: 78
Discharge: HOME | End: 2023-01-13
Payer: MEDICARE

## 2023-01-13 PROCEDURE — 97112 NEUROMUSCULAR REEDUCATION: CPT

## 2023-01-13 PROCEDURE — 97110 THERAPEUTIC EXERCISES: CPT

## 2023-01-13 NOTE — PROGRESS NOTES
PT DAILY TREATMENT NOTE - [facility]     Patient Name:  Ao  Date:2023  : 1945  [x]  Patient  Verified  Payor: Kg Reed / Plan: [facility] MEDICARE CHOICE PPO/PFFS / Product Type: Managed Care Medicare /    In time:08  Out time:0910  Total Treatment Time (min): 40  Total Timed Codes (min): 40   1:1 Treatment Time ([facility] only): 30   Visit #: 11 of 16    Treatment Area: Pain in right knee [M25.561]  Other abnormalities of gait and mobility [R26.89]    SUBJECTIVE  Pain Level (0-10 scale): 6  Any medication changes, allergies to medications, adverse drug reactions, diagnosis change, or new procedure performed?: [x] No    [] Yes (see summary sheet for update)  Subjective functional status/changes:   [] No changes reported  Patient reports that she is doing well but is limited by knee pain. OBJECTIVE     15 min Therapeutic Exercise:  [x] See flow sheet :   Rationale: increase ROM, increase strength and decrease pain to improve the patients ability to complete ADLs     15 min Neuromuscular Re-education:  [x]  See flow sheet :   Rationale: improve coordination, improve balance and increase proprioception  to improve the patients ability to complete ADLs          With   [x] TE   [] TA   [] neuro   [] other: Patient Education: [x] Review HEP    [] Progressed/Changed HEP based on:   [] positioning   [] body mechanics   [] transfers   [] heat/ice application    [] other:      Other Objective/Functional Measures: NA     Pain Level (0-10 scale) post treatment: 5    ASSESSMENT/Changes in Function: Patient responds well to treatment session. Patient required cues to recall exercise parameters. Provided close supervision during balance activities for safety. She was challenged with exercise prescribed.  No adverse effects were noted from today's treatment session      Patient will continue to benefit from skilled PT services to modify and progress therapeutic interventions, address functional mobility deficits, address ROM deficits, address strength deficits, analyze and address soft tissue restrictions, analyze and cue movement patterns, analyze and modify body mechanics/ergonomics, assess and modify postural abnormalities, address imbalance and instruct in home and community integration to attain remaining goals. []  See Plan of Care  []  See progress note/recertification  []  See Discharge Summary         Progress towards goals / Updated goals:  Short Term Goals:    to be accomplished within 8 treatments:     Patient will be compliant and independent with prescribed HEP(s) in order to assist in maximizing therapeutic gains and improving overall function. Eval: HEP to be issued and reviewed with patient within 1-2 visits  Current: MET 1/4/23 pt reports compliance      Patient will be able to demonstrate functional trunk AROM in all planes without any LOB and good weight shift in order to reduce risk of falls during activities such as bending forward to  something on the floor. Eval: Trunk AROM: WFL in all planes, asymptomatic though one posterior LOB when returning from flexion to neutral position requiring stepping strategy     Patient will be able to perform at least 12-17 reps of sit <> stands during 30\" STS test to demonstrate improved LE strength and stability during this functional activity, thus reducing the patients risk of falls. Eval: unable to test today as patient had to leave eval early, will test on second visit   Current: 6 repetitions. 12/21/2022, in progress     Long Term Goals:     to be accomplished within 16 treatments:     Patient will score at least 51 points on FOTO in order to maximize function and promote patient satisfaction with overall outcome.   (Maxcine Gemma is an established functional score where a score of 100 = no disability)  Eval: 40     Patient will demonstrate at least 4/5 strength bilaterally in order to improve safety with sit <> stands, stair negotiations, and walking. Eval: Grossly 5/5 bilaterally except bilateral hip flexion 4/5, bilateral hip abduction 2-/5, and bilateral hip extension 3+/5     Patient will be able to perform SLS (bilaterally) for at least 30 seconds in order to show a reduction in the risk of falls. Eval: unable to test today as patient had to leave eval early, will test on second visit              Current: SLS eyes open, left 5 seconds, right 5 seconds.      1/13/2023, in progress    PLAN  []  Upgrade activities as tolerated     [x]  Continue plan of care  []  Update interventions per flow sheet       []  Discharge due to:_  []  Other:_      Pam Aranda, PT, DPT 1/13/2023  9:21 AM    Future Appointments   Date Time Provider Mally Jamison   1/16/2023  2:00 PM Celia Aguilar PT [facility]   1/18/2023 10:00 AM MARINA Camejo [facility]   1/23/2023 10:00 AM MARINA Camejo [facility]   1/25/2023 10:00 AM [facility]

## 2023-01-16 ENCOUNTER — HOSPITAL ENCOUNTER (OUTPATIENT)
Dept: PHYSICAL THERAPY | Age: 78
Discharge: HOME | End: 2023-01-16
Payer: MEDICARE

## 2023-01-16 PROCEDURE — 97110 THERAPEUTIC EXERCISES: CPT

## 2023-01-16 PROCEDURE — 97112 NEUROMUSCULAR REEDUCATION: CPT

## 2023-01-16 NOTE — PROGRESS NOTES
PT DAILY TREATMENT NOTE - [facility]     Patient Name: Sharda Mahoney  Date:2023  : 1945  [x]  Patient  Verified  Payor: Vazquez Backial / Plan: [facility] MEDICARE CHOICE PPO/PFFS / Product Type: Managed Care Medicare /    In time:1405  Out time:1435  Total Treatment Time (min): 30  Total Timed Codes (min): 30   1:1 Treatment Time ( [address] only): 30   Visit #: 12  16    Treatment Area: Pain in right knee [M25.561]  Other abnormalities of gait and mobility [R26.89]    SUBJECTIVE  Pain Level (0-10 scale): 6  Any medication changes, allergies to medications, adverse drug reactions, diagnosis change, or new procedure performed?: [x] No    [] Yes (see summary sheet for update)  Subjective functional status/changes:   [] No changes reported    Patient reports pain and stiffness but is feeling stronger and more steady. She states that she is consistent with HEP. OBJECTIVE    20 min Therapeutic Exercise:  [x] See flow sheet :   Rationale: increase ROM, increase strength and decrease pain to improve the patients ability to complete ADLs     10 min Neuromuscular Re-education:  [x]  See flow sheet :   Rationale: improve coordination, improve balance and increase proprioception  to improve the patients ability to complete ADLs          With   [x] TE   [] TA   [] neuro   [] other: Patient Education: [x] Review HEP    [] Progressed/Changed HEP based on:   [] positioning   [] body mechanics   [] transfers   [] heat/ice application    [] other:      Other Objective/Functional Measures:   MMT 4-/5 globally  FOTO 43  10 STS in 30 seconds       Pain Level (0-10 scale) post treatment: 6    ASSESSMENT/Changes in Function:     Patient responds well to treatment session. . No adverse effects were noted from today's treatment session. Patient is improving as she has met 2/3 STGs and is progressing toward her remaining goals. She is compliant with initial HEP.  She continues to be limited by strength/functional mobility resulting in reduced balance and decreased activity tolerance. Patient will continue to benefit from skilled PT services to modify and progress therapeutic interventions, address functional mobility deficits, address ROM deficits, address strength deficits, analyze and address soft tissue restrictions, analyze and cue movement patterns, analyze and modify body mechanics/ergonomics, assess and modify postural abnormalities, address imbalance and instruct in home and community integration to attain remaining goals. []  See Plan of Care  []  See progress note/recertification  []  See Discharge Summary         Progress towards goals / Updated goals:  Short Term Goals:    to be accomplished within 8 treatments:     Patient will be compliant and independent with prescribed HEP(s) in order to assist in maximizing therapeutic gains and improving overall function. Eval: HEP to be issued and reviewed with patient within 1-2 visits  Current: MET 1/4/23 pt reports compliance      Patient will be able to demonstrate functional trunk AROM in all planes without any LOB and good weight shift in order to reduce risk of falls during activities such as bending forward to  something on the floor. Eval: Trunk AROM: WFL in all planes, asymptomatic though one posterior LOB when returning from flexion to neutral position requiring stepping strategy   Current: Met, 1/16/2023     Patient will be able to perform at least 12-17 reps of sit <> stands during 30\" STS test to demonstrate improved LE strength and stability during this functional activity, thus reducing the patients risk of falls. Eval: unable to test today as patient had to leave eval early, will test on second visit   Current:   In-progress, 10 reps, 1/16/2023     Long Term Goals:     to be accomplished within 16 treatments:     Patient will score at least 51 points on FOTO in order to maximize function and promote patient satisfaction with overall outcome. (FOTO is an established functional score where a score of 100 = no disability)  Eval: 40  Current: In-progress, 37, 1/16/2023     Patient will demonstrate at least 4/5 strength bilaterally in order to improve safety with sit <> stands, stair negotiations, and walking. Eval: Grossly 5/5 bilaterally except bilateral hip flexion 4/5, bilateral hip abduction 2-/5, and bilateral hip extension 3+/5  Current: In-progress, 4-/5 globally, 1/16/2023     Patient will be able to perform SLS (bilaterally) for at least 30 seconds in order to show a reduction in the risk of falls. Eval: unable to test today as patient had to leave eval early, will test on second visit              Current: SLS eyes open, left 5 seconds, right 5 seconds.      1/13/2023, in progress  PLAN  []  Upgrade activities as tolerated     [x]  Continue plan of care  []  Update interventions per flow sheet       []  Discharge due to:_  []  Other:_      Maki Puentes PT, DPT 1/16/2023  1:58 PM    Future Appointments   Date Time Provider Mally Jamison   1/16/2023  2:00 PM MARINA Kiser [facility]   1/18/2023 10:00 AM MARINA Kiser [facility]   1/23/2023 10:00 AM MARINA Kiser THE [facility]   1/25/2023 10:00 AM Eloina Oliver PT [facility]

## 2023-01-16 NOTE — PROGRESS NOTES
[facility] at [address] [place] [address] Drive: [phone]   Fax: [phone]  Progress Note  Patient Name: Queen Adonis : 1945   Medical   Diagnosis: Pain in right knee [M25.561]  Other abnormalities of gait and mobility [R26.89] Treatment Diagnosis: Right knee pain  Other abnormalities of gait   Onset Date: 22          Referral Source: [facility] Boogie MD [facility]): 2022   Prior Hospitalization: See medical history Provider #: [id number]   Prior Level of Function: Moderate level of activity   Comorbidities: polymyositis including weekly subcutaneous infusions, osteoporosis, hypertension, history of multiple falls, chronic right knee pain s/p fall in 2022, prolapsed bladder with prior surgery   Medications: Verified on Patient Summary List      ===========================================================================================  Assessment / Summary of Care: Queen Adonis is a 69 y.o. female with c/o decreased balance x 1 year as well as right knee pain s/p fall in 2022. Patient is improving as she has met 2/3 STGs and is progressing toward her remaining goals. She is compliant with initial HEP. She continues to be limited by strength/functional mobility resulting in reduced balance and decreased activity tolerance.  Patient will continue to benefit from skilled PT services to modify and progress therapeutic interventions, address functional mobility deficits, address ROM deficits, address strength deficits, analyze and address soft tissue restrictions, analyze and cue movement patterns, analyze and modify body mechanics/ergonomics, assess and modify postural abnormalities, address imbalance and instruct in home and community integration to attain remaining goals.     ===========================================================================================    Plan:Continue therapy per initial plan/protocol at a frequency of  2 x per week for 4 weeks    Goals:   Short Term Goals:    to be accomplished within 8 treatments:     Patient will be compliant and independent with prescribed HEP(s) in order to assist in maximizing therapeutic gains and improving overall function. Eval: HEP to be issued and reviewed with patient within 1-2 visits  Current: MET 1/4/23 pt reports compliance      Patient will be able to demonstrate functional trunk AROM in all planes without any LOB and good weight shift in order to reduce risk of falls during activities such as bending forward to  something on the floor. Eval: Trunk AROM: WFL in all planes, asymptomatic though one posterior LOB when returning from flexion to neutral position requiring stepping strategy              Current: Met, 1/16/2023     Patient will be able to perform at least 12-17 reps of sit <> stands during 30\" STS test to demonstrate improved LE strength and stability during this functional activity, thus reducing the patients risk of falls. Eval: unable to test today as patient had to leave eval early, will test on second visit   Current: In-progress, 10 reps, 1/16/2023     Long Term Goals:     to be accomplished within 16 treatments:     Patient will score at least 51 points on FOTO in order to maximize function and promote patient satisfaction with overall outcome. (FOTO is an established functional score where a score of 100 = no disability)  Eval: 40  Current: In-progress, 37, 1/16/2023     Patient will demonstrate at least 4/5 strength bilaterally in order to improve safety with sit <> stands, stair negotiations, and walking. Eval: Grossly 5/5 bilaterally except bilateral hip flexion 4/5, bilateral hip abduction 2-/5, and bilateral hip extension 3+/5  Current: In-progress, 4-/5 globally, 1/16/2023     Patient will be able to perform SLS (bilaterally) for at least 30 seconds in order to show a reduction in the risk of falls.               Eval: unable to test today as patient had to leave eval early, will test on second visit              Current: SLS eyes open, left 5 seconds, right 5 seconds. 1/13/2023, in progress    ===========================================================================================  Subjective: Patient reports pain and stiffness but is feeling stronger and more steady. She states that she is consistent with HEP.      Objective:   MMT 4-/5 globally  FOTO 43  10 STS in 30 seconds    Therapist Signature: Laura Trejo PT, DPT Date: 1/10/7614   Re-Certification: NA Time: 5:12 PM       [facility] at [address]  Phone: [phone]   Fax: [phone]

## 2023-01-18 ENCOUNTER — HOSPITAL ENCOUNTER (OUTPATIENT)
Dept: PHYSICAL THERAPY | Age: 78
Discharge: HOME | End: 2023-01-18
Payer: MEDICARE

## 2023-01-18 PROCEDURE — 97110 THERAPEUTIC EXERCISES: CPT

## 2023-01-18 PROCEDURE — 97112 NEUROMUSCULAR REEDUCATION: CPT

## 2023-01-18 NOTE — PROGRESS NOTES
PT DAILY TREATMENT NOTE - [facility]     Patient Name: Epi Quick  Date:2023  : 1945  [x]  Patient  Verified  Payor: Beulah Humphreys / Plan: [facility] [employer] MEDICARE CHOICE PPO/PFFS / Product Type: Managed Care Medicare /    In time:1010  Out time:1035  Total Treatment Time (min): 25  Total Timed Codes (min): 25   1:1 Treatment Time ( [address] only): 25   Visit #: 13 of 24    Treatment Area: Pain in right knee [M25.561]  Other abnormalities of gait and mobility [R26.89]    SUBJECTIVE  Pain Level (0-10 scale): 5-6  Any medication changes, allergies to medications, adverse drug reactions, diagnosis change, or new procedure performed?: [x] No    [] Yes (see summary sheet for update)  Subjective functional status/changes:   [] No changes reported    Patient reports that she is making a better effort to got to the gym. OBJECTIVE    15 min Therapeutic Exercise:  [x] See flow sheet :   Rationale: increase ROM, increase strength and decrease pain to improve the patients ability to complete ADLs    10 min Neuromuscular Re-education:  [x]  See flow sheet :   Rationale: improve coordination, improve balance and increase proprioception  to improve the patients ability to complete ADLs          With   [x] TE   [] TA   [] neuro   [] other: Patient Education: [x] Review HEP    [] Progressed/Changed HEP based on:   [] positioning   [] body mechanics   [] transfers   [] heat/ice application    [] other:      Other Objective/Functional Measures: NA     Pain Level (0-10 scale) post treatment: 5    ASSESSMENT/Changes in Function:    Patient responds well to treatment session. Patient required cues to reduce UEA with balance activities. Provided close supervision for safety. Will advance exercise as tolerated.   No adverse effects were noted from today's treatment session    Patient will continue to benefit from skilled PT services to modify and progress therapeutic interventions, address functional mobility deficits, address ROM deficits, address strength deficits, analyze and address soft tissue restrictions, analyze and cue movement patterns, analyze and modify body mechanics/ergonomics, assess and modify postural abnormalities, address imbalance and instruct in home and community integration to attain remaining goals. []  See Plan of Care  []  See progress note/recertification  []  See Discharge Summary         Progress towards goals / Updated goals:  Short Term Goals:    to be accomplished within 8 treatments:     Patient will be compliant and independent with prescribed HEP(s) in order to assist in maximizing therapeutic gains and improving overall function. Eval: HEP to be issued and reviewed with patient within 1-2 visits  Current: MET 1/4/23 pt reports compliance      Patient will be able to demonstrate functional trunk AROM in all planes without any LOB and good weight shift in order to reduce risk of falls during activities such as bending forward to  something on the floor. Eval: Trunk AROM: WFL in all planes, asymptomatic though one posterior LOB when returning from flexion to neutral position requiring stepping strategy              Current: Met, 1/16/2023     Patient will be able to perform at least 12-17 reps of sit <> stands during 30\" STS test to demonstrate improved LE strength and stability during this functional activity, thus reducing the patients risk of falls. Eval: unable to test today as patient had to leave eval early, will test on second visit   Current: In-progress, 10 reps, 1/16/2023     Long Term Goals:     to be accomplished within 16 treatments:     Patient will score at least 51 points on FOTO in order to maximize function and promote patient satisfaction with overall outcome. (FOTO is an established functional score where a score of 100 = no disability)  Eval: 40  Current:  In-progress, 43, 1/16/2023     Patient will demonstrate at least 4/5 strength bilaterally in order to improve safety with sit <> stands, stair negotiations, and walking. Eval: Grossly 5/5 bilaterally except bilateral hip flexion 4/5, bilateral hip abduction 2-/5, and bilateral hip extension 3+/5  Current: In-progress, 4-/5 globally, 1/16/2023     Patient will be able to perform SLS (bilaterally) for at least 30 seconds in order to show a reduction in the risk of falls.               Eval: unable to test today as patient had to leave eval early, will test on second visit              Current: SLS eyes open, left 5 seconds, right 5 seconds    PLAN  []  Upgrade activities as tolerated     [x]  Continue plan of care  []  Update interventions per flow sheet       []  Discharge due to:_  []  Other:_      Lauro Almaraz, PT, DPT 1/18/2023  10:23 AM    Future Appointments   Date Time Provider Mally Jamison   1/23/2023 10:00 AM MARINA Rea THE [facility]   1/25/2023 10:00 AM MARINA Rea THE [facility]

## 2023-01-23 ENCOUNTER — HOSPITAL ENCOUNTER (OUTPATIENT)
Dept: PHYSICAL THERAPY | Age: 78
Discharge: HOME | End: 2023-01-23
Payer: MEDICARE

## 2023-01-23 PROCEDURE — 97112 NEUROMUSCULAR REEDUCATION: CPT

## 2023-01-23 PROCEDURE — 97110 THERAPEUTIC EXERCISES: CPT

## 2023-01-23 NOTE — PROGRESS NOTES
PT DAILY TREATMENT NOTE - [facility]     Patient Name: Lj Esposito  Date:2023  : 1945  [x]  Patient  Verified  Payor: Kimberly Jeff / Plan: [facility] [employer] MEDICARE CHOICE PPO/PFFS / Product Type: Managed Care Medicare /    In time:1000  Out time:1030  Total Treatment Time (min): 30  Total Timed Codes (min): 30   1:1 Treatment Time ([facility] only): 30   Visit #: 14 of 24    Treatment Area: Pain in right knee [M25.561]  Other abnormalities of gait and mobility [R26.89]    SUBJECTIVE  Pain Level (0-10 scale): 6  Any medication changes, allergies to medications, adverse drug reactions, diagnosis change, or new procedure performed?: [x] No    [] Yes (see summary sheet for update)  Subjective functional status/changes:   [] No changes reported    Patient reports that she is doing well. She states that she had a busy weekend and feels tired from family visiting. OBJECTIVE    10 min Therapeutic Exercise:  [x] See flow sheet :   Rationale: increase ROM, increase strength and decrease pain to improve the patients ability to complete ADLs     20 min Neuromuscular Re-education:  [x]  See flow sheet :   Rationale: improve coordination, improve balance and increase proprioception  to improve the patients ability to complete ADLs          With   [x] TE   [] TA   [] neuro   [] other: Patient Education: [x] Review HEP    [] Progressed/Changed HEP based on:   [] positioning   [] body mechanics   [] transfers   [] heat/ice application    [] other:      Other Objective/Functional Measures: NA     Pain Level (0-10 scale) post treatment: 0    ASSESSMENT/Changes in Function: Patient responds well to treatment session. Advance exercise by introducing dynamic balance exercise. She required UEA from parallel bars to maintain balance. Will continue to advance exercise as tolerated.  No adverse effects were noted from today's treatment session    Patient will continue to benefit from skilled PT services to modify and progress therapeutic interventions, address functional mobility deficits, address ROM deficits, address strength deficits, analyze and address soft tissue restrictions, analyze and cue movement patterns, analyze and modify body mechanics/ergonomics, assess and modify postural abnormalities, address imbalance and instruct in home and community integration to attain remaining goals. []  See Plan of Care  []  See progress note/recertification  []  See Discharge Summary         Progress towards goals / Updated goals:    Short Term Goals:    to be accomplished within 8 treatments:     Patient will be compliant and independent with prescribed HEP(s) in order to assist in maximizing therapeutic gains and improving overall function. Eval: HEP to be issued and reviewed with patient within 1-2 visits  Current: MET 1/4/23 pt reports compliance      Patient will be able to demonstrate functional trunk AROM in all planes without any LOB and good weight shift in order to reduce risk of falls during activities such as bending forward to  something on the floor. Eval: Trunk AROM: WFL in all planes, asymptomatic though one posterior LOB when returning from flexion to neutral position requiring stepping strategy              Current: Met, 1/16/2023     Patient will be able to perform at least 12-17 reps of sit <> stands during 30\" STS test to demonstrate improved LE strength and stability during this functional activity, thus reducing the patients risk of falls. Eval: unable to test today as patient had to leave eval early, will test on second visit   Current: In-progress, 10 reps, 1/16/2023     Long Term Goals:     to be accomplished within 16 treatments:     Patient will score at least 51 points on FOTO in order to maximize function and promote patient satisfaction with overall outcome. (FOTO is an established functional score where a score of 100 = no disability)  Eval: 40  Current:  In-progress, 43, 1/16/2023 Patient will demonstrate at least 4/5 strength bilaterally in order to improve safety with sit <> stands, stair negotiations, and walking. Eval: Grossly 5/5 bilaterally except bilateral hip flexion 4/5, bilateral hip abduction 2-/5, and bilateral hip extension 3+/5  Current: In-progress, 4-/5 globally, 1/16/2023     Patient will be able to perform SLS (bilaterally) for at least 30 seconds in order to show a reduction in the risk of falls. Eval: unable to test today as patient had to leave eval early, will test on second visit              Current:  In-progress, SLS on foam for 30 seconds with mild UEA, 1/23/2023     PLAN  []  Upgrade activities as tolerated     [x]  Continue plan of care  []  Update interventions per flow sheet       []  Discharge due to:_  []  Other:_      Keaton Thacker PT, DPT 1/23/2023  10:07 AM    Future Appointments   Date Time Provider Mally Jamison   1/25/2023 10:00 AM [facility]

## 2023-01-25 ENCOUNTER — HOSPITAL ENCOUNTER (OUTPATIENT)
Dept: PHYSICAL THERAPY | Age: 78
Discharge: HOME | End: 2023-01-25
Payer: MEDICARE

## 2023-01-25 PROCEDURE — 97112 NEUROMUSCULAR REEDUCATION: CPT

## 2023-01-25 PROCEDURE — 97110 THERAPEUTIC EXERCISES: CPT

## 2023-01-25 PROCEDURE — 97530 THERAPEUTIC ACTIVITIES: CPT

## 2023-01-25 NOTE — PROGRESS NOTES
PT DAILY TREATMENT NOTE - [facility]     Patient Name: Marek Shah  Date:2023  : 1945  [x]  Patient  Verified  Payor: Lidia Ibarra / Plan: [facility] [employer] MEDICARE CHOICE PPO/PFFS / Product Type: Managed Care Medicare /    In time:1000  Out time:1038  Total Treatment Time (min): 38  Total Timed Codes (min): 38   1:1 Treatment Time ( [address] only): 45   Visit #: 15 of 24    Treatment Area: Pain in right knee [M25.561]  Other abnormalities of gait and mobility [R26.89]    SUBJECTIVE  Pain Level (0-10 scale): 5  Any medication changes, allergies to medications, adverse drug reactions, diagnosis change, or new procedure performed?: [x] No    [] Yes (see summary sheet for update)  Subjective functional status/changes:   [] No changes reported    Patient reports that she is feeling stronger and safer when walking. OBJECTIVE    15 min Therapeutic Exercise:  [x] See flow sheet :   Rationale: increase ROM, increase strength and decrease pain to improve the patients ability to complete ADLs    8 min Therapeutic Activity:  [x]  See flow sheet :   Rationale: increase ROM, increase strength and improve coordination  to improve the patients ability to complete ADLs     15 min Neuromuscular Re-education:  [x]  See flow sheet :   Rationale: improve coordination, improve balance and increase proprioception  to improve the patients ability to complete ADLs          With   [x] TE   [] TA   [] neuro   [] other: Patient Education: [x] Review HEP    [] Progressed/Changed HEP based on:   [] positioning   [] body mechanics   [] transfers   [] heat/ice application    [] other:      Other Objective/Functional Measures: NA     Pain Level (0-10 scale) post treatment: 5    ASSESSMENT/Changes in Function: Patient responds well to treatment session. Advanced balance activities and provided close supervision for safety. Patient was challenged with exercise prescribed. Will continue to advance exercise as tolerated.  No adverse effects were noted from today's treatment session      Patient will continue to benefit from skilled PT services to modify and progress therapeutic interventions, address functional mobility deficits, address ROM deficits, address strength deficits, analyze and address soft tissue restrictions, analyze and cue movement patterns, analyze and modify body mechanics/ergonomics, assess and modify postural abnormalities, address imbalance and instruct in home and community integration to attain remaining goals. []  See Plan of Care  []  See progress note/recertification  []  See Discharge Summary         Progress towards goals / Updated goals:  Short Term Goals:    to be accomplished within 8 treatments:     Patient will be compliant and independent with prescribed HEP(s) in order to assist in maximizing therapeutic gains and improving overall function. Eval: HEP to be issued and reviewed with patient within 1-2 visits  Current: MET 1/4/23 pt reports compliance      Patient will be able to demonstrate functional trunk AROM in all planes without any LOB and good weight shift in order to reduce risk of falls during activities such as bending forward to  something on the floor. Eval: Trunk AROM: WFL in all planes, asymptomatic though one posterior LOB when returning from flexion to neutral position requiring stepping strategy              Current: Met, 1/16/2023     Patient will be able to perform at least 12-17 reps of sit <> stands during 30\" STS test to demonstrate improved LE strength and stability during this functional activity, thus reducing the patients risk of falls. Eval: unable to test today as patient had to leave eval early, will test on second visit   Current:   In-progress, 10 reps, 1/16/2023     Long Term Goals:     to be accomplished within 16 treatments:     Patient will score at least 51 points on FOTO in order to maximize function and promote patient satisfaction with overall outcome. (FOTO is an established functional score where a score of 100 = no disability)  Eval: 40  Current: In-progress, 37, 1/16/2023     Patient will demonstrate at least 4/5 strength bilaterally in order to improve safety with sit <> stands, stair negotiations, and walking. Eval: Grossly 5/5 bilaterally except bilateral hip flexion 4/5, bilateral hip abduction 2-/5, and bilateral hip extension 3+/5  Current: In-progress, 4-/5 globally, 1/16/2023     Patient will be able to perform SLS (bilaterally) for at least 30 seconds in order to show a reduction in the risk of falls. Eval: unable to test today as patient had to leave eval early, will test on second visit              Current:  In-progress, SLS on foam for 30 seconds with mild UEA, 1/23/2023       PLAN  []  Upgrade activities as tolerated     [x]  Continue plan of care  []  Update interventions per flow sheet       []  Discharge due to:_  []  Other:_      Audrey Pablo PT, DPT 1/25/2023  10:33 AM    Future Appointments   Date Time Provider Mally Jamison   1/30/2023  8:30 AM Darlene Bell PT [facility]   2/1/2023  8:30 AM Darlene Bell, PT [facility]   2/7/2023  4:30 PM Darlene Bell PT [facility]   2/10/2023  8:00 AM Darlene Bell PT [facility]   2/15/2023 11:00 AM Tayler Rodriguez PT [facility]   2/17/2023  8:00 AM Tayler Rodriguez PT [facility]   2/20/2023  9:30 AM Darlene Bell, PT [facility]   2/22/2023  9:30 AM Darlene Ray, PT [facility]

## 2023-01-30 ENCOUNTER — HOSPITAL ENCOUNTER (OUTPATIENT)
Dept: PHYSICAL THERAPY | Age: 78
Discharge: HOME | End: 2023-01-30
Payer: MEDICARE

## 2023-01-30 PROCEDURE — 97112 NEUROMUSCULAR REEDUCATION: CPT

## 2023-01-30 PROCEDURE — 97110 THERAPEUTIC EXERCISES: CPT

## 2023-01-30 NOTE — PROGRESS NOTES
PT DAILY TREATMENT NOTE - [facility]     Patient Name: Elaina Soto  Date:2023  : 1945  [x]  Patient  Verified  Payor: Michael Myles / Plan: [facility] MEDICARE CHOICE PPO/PFFS / Product Type: Managed Care Medicare /    In time:0830  Out time:0900  Total Treatment Time (min): 30  Total Timed Codes (min): 30   1:1 Treatment Time ( only): 25   Visit #: 16 of 24    Treatment Area: Pain in right knee [M25.561]  Other abnormalities of gait and mobility [R26.89]    SUBJECTIVE  Pain Level (0-10 scale): 4-5  Any medication changes, allergies to medications, adverse drug reactions, diagnosis change, or new procedure performed?: [x] No    [] Yes (see summary sheet for update)  Subjective functional status/changes:   [] No changes reported    Patient reports no new changes since last visit. Reports compliance with HEP. OBJECTIVE    10 min Therapeutic Exercise:  [x] See flow sheet :   Rationale: increase ROM, increase strength and decrease pain to improve the patients ability to complete ADLs     15 min Neuromuscular Re-education:  [x]  See flow sheet :   Rationale: improve coordination, improve balance and increase proprioception  to improve the patients ability to complete ADLs        With   [x] TE   [] TA   [] neuro   [] other: Patient Education: [x] Review HEP    [] Progressed/Changed HEP based on:   [] positioning   [] body mechanics   [] transfers   [] heat/ice application    [] other:      Other Objective/Functional Measures: NA     Pain Level (0-10 scale) post treatment: 4    ASSESSMENT/Changes in Function:   Patient responds well to treatment session. Patient demonstrated improved balance as she required less UEA with static balance activities. She continues to be challenged with dynamic balance requiring minimal UEA from parallel bars. Provided CGA as needed.  No adverse effects were noted from today's treatment session      Patient will continue to benefit from skilled PT services to modify and progress therapeutic interventions, address functional mobility deficits, address ROM deficits, address strength deficits, analyze and address soft tissue restrictions, analyze and cue movement patterns, analyze and modify body mechanics/ergonomics, assess and modify postural abnormalities, address imbalance and instruct in home and community integration to attain remaining goals. []  See Plan of Care  []  See progress note/recertification  []  See Discharge Summary         Progress towards goals / Updated goals:  Short Term Goals:    to be accomplished within 8 treatments:     Patient will be compliant and independent with prescribed HEP(s) in order to assist in maximizing therapeutic gains and improving overall function. Eval: HEP to be issued and reviewed with patient within 1-2 visits  Current: MET 1/4/23 pt reports compliance      Patient will be able to demonstrate functional trunk AROM in all planes without any LOB and good weight shift in order to reduce risk of falls during activities such as bending forward to  something on the floor. Eval: Trunk AROM: WFL in all planes, asymptomatic though one posterior LOB when returning from flexion to neutral position requiring stepping strategy              Current: Met, 1/16/2023     Patient will be able to perform at least 12-17 reps of sit <> stands during 30\" STS test to demonstrate improved LE strength and stability during this functional activity, thus reducing the patients risk of falls. Eval: unable to test today as patient had to leave [facility] early, will test on second visit   Current: In-progress, 10 reps, 1/16/2023     Long Term Goals:     to be accomplished within 16 treatments:     Patient will score at least 51 points on FOTO in order to maximize function and promote patient satisfaction with overall outcome.   (FOTO is an established functional score where a score of 100 = no disability)  Eval: 40  Current: In-progress, 37, 1/16/2023     Patient will demonstrate at least 4/5 strength bilaterally in order to improve safety with sit <> stands, stair negotiations, and walking. Eval: Grossly 5/5 bilaterally except bilateral hip flexion 4/5, bilateral hip abduction 2-/5, and bilateral hip extension 3+/5  Current: In-progress, 4-/5 globally, 1/16/2023     Patient will be able to perform SLS (bilaterally) for at least 30 seconds in order to show a reduction in the risk of falls. Eval: unable to test today as patient had to leave eval early, will test on second visit              Current:  In-progress, SLS on foam for 30 seconds with mild UEA, 1/23/2023       PLAN  []  Upgrade activities as tolerated     [x]  Continue plan of care  []  Update interventions per flow sheet       []  Discharge due to:_  []  Other:_      Roseann Guaman, PT, DPT 1/30/2023  8:41 AM    Future Appointments   Date Time Provider Mally Jamison   2/1/2023  8:30 AM Mel Browne PT [facility]   2/7/2023  4:30 PM Mel Browne PT [facility]   2/10/2023  8:00 AM Mel Browne PT [facility]   2/15/2023 11:00 AM Sergio Deshpande PT [facility]   2/17/2023  8:00 AM Sergio Deshpande PT [facility]   2/20/2023  9:30 AM Mel Browne PT [facility]   2/22/2023  9:30 AM Mel Browne PT [facility]

## 2023-02-01 ENCOUNTER — HOSPITAL ENCOUNTER (OUTPATIENT)
Dept: PHYSICAL THERAPY | Age: 78
Discharge: HOME | End: 2023-02-01
Payer: MEDICARE

## 2023-02-01 PROCEDURE — 97112 NEUROMUSCULAR REEDUCATION: CPT

## 2023-02-01 PROCEDURE — 97110 THERAPEUTIC EXERCISES: CPT

## 2023-02-01 NOTE — PROGRESS NOTES
PT DAILY TREATMENT NOTE - [facility]     Patient Name: Genny Perez  Date:2023  : 1945  [x]  Patient  Verified  Payor: Flaquita Nichols / Plan: [facility] MEDICARE CHOICE PPO/PFFS / Product Type: Managed Care Medicare /    In time:830  Out time:905  Total Treatment Time (min): 35  Total Timed Codes (min): 35   1:1 Treatment Time ( [address] only): 30   Visit #: 5 of 16    Treatment Area: Pain in right knee [M25.561]  Other abnormalities of gait and mobility [R26.89]    SUBJECTIVE  Pain Level (0-10 scale): 5  Any medication changes, allergies to medications, adverse drug reactions, diagnosis change, or new procedure performed?: [x] No    [] Yes (see summary sheet for update)  Subjective functional status/changes:   [] No changes reported    Patient reports that she is doing well but is tired today from the weather. OBJECTIVE    10 min Therapeutic Exercise:  [x] See flow sheet :   Rationale: increase ROM, increase strength and decrease pain to improve the patients ability to complete ADLs     20 min Neuromuscular Re-education:  [x]  See flow sheet :   Rationale: improve coordination, improve balance and increase proprioception  to improve the patients ability to complete ADLs          With   [x] TE   [] TA   [] neuro   [] other: Patient Education: [x] Review HEP    [] Progressed/Changed HEP based on:   [] positioning   [] body mechanics   [] transfers   [] heat/ice application    [] other:      Other Objective/Functional Measures: NA     Pain Level (0-10 scale) post treatment: 4    ASSESSMENT/Changes in Function: Patient responds well to treatment session. Provided cues to reduce UEA with balance activities as patient is demonstrated improved balance. She was challenged with exercise prescribed. Will advance exercise as tolerated.  No adverse effects were noted from today's treatment session    Patient will continue to benefit from skilled PT services to modify and progress therapeutic interventions, address functional mobility deficits, address ROM deficits, address strength deficits, analyze and address soft tissue restrictions, analyze and cue movement patterns, analyze and modify body mechanics/ergonomics, assess and modify postural abnormalities, address imbalance and instruct in home and community integration to attain remaining goals. []  See Plan of Care  []  See progress note/recertification  []  See Discharge Summary         Progress towards goals / Updated goals:  Short Term Goals:    to be accomplished within 8 treatments:     Patient will be compliant and independent with prescribed HEP(s) in order to assist in maximizing therapeutic gains and improving overall function. Eval: HEP to be issued and reviewed with patient within 1-2 visits  Current: Met, 2/1/2023     Patient will be able to demonstrate functional trunk AROM in all planes without any LOB and good weight shift in order to reduce risk of falls during activities such as bending forward to  something on the floor. Eval: Trunk AROM: WFL in all planes, asymptomatic though one posterior LOB when returning from flexion to neutral position requiring stepping strategy     Patient will be able to perform at least 12-17 reps of sit <> stands during 30\" STS test to demonstrate improved LE strength and stability during this functional activity, thus reducing the patients risk of falls. Eval: unable to test today as patient had to leave eval early, will test on second visit   Current: 6 repetitions. 12/21/2022, in progress     Long Term Goals:     to be accomplished within 16 treatments:     Patient will score at least 51 points on FOTO in order to maximize function and promote patient satisfaction with overall outcome.   (Raenelle Dock is an established functional score where a score of 100 = no disability)  Eval: 40     Patient will demonstrate at least 4/5 strength bilaterally in order to improve safety with sit <> stands, stair negotiations, and walking. Eval: Grossly 5/5 bilaterally except bilateral hip flexion 4/5, bilateral hip abduction 2-/5, and bilateral hip extension 3+/5     Patient will be able to perform SLS (bilaterally) for at least 30 seconds in order to show a reduction in the risk of falls. Eval: unable to test today as patient had to leave eval early, will test on second visit              Current: SLS eyes open, left 3 seconds, right 3 seconds.      12/21/2022, in progress       PLAN  []  Upgrade activities as tolerated     [x]  Continue plan of care  []  Update interventions per flow sheet       []  Discharge due to:_  []  Other:_      Shyam Cuadra, PT, DPT 2/1/2023  8:27 AM    Future Appointments   Date Time Provider Mally Jamison   2/1/2023  8:30 AM MARINA GongHPTFABI [facility]   2/7/2023  4:30 PM MARINA GongHPTFABI [facility]   2/10/2023  8:00 AM Christopher Mittal PT [facility]   2/15/2023 11:00 AM Latanya Downing PT [facility]   2/17/2023  8:00 AM Latanya Downing PT [facility]   2/20/2023  9:30 AM Christopher Mittal PT [facility]   2/22/2023  9:30 AM Christopher Mittal PT [facility]

## 2023-02-07 ENCOUNTER — HOSPITAL ENCOUNTER (OUTPATIENT)
Dept: PHYSICAL THERAPY | Age: 78
Discharge: HOME | End: 2023-02-07
Payer: MEDICARE

## 2023-02-07 PROCEDURE — 97110 THERAPEUTIC EXERCISES: CPT

## 2023-02-07 PROCEDURE — 97112 NEUROMUSCULAR REEDUCATION: CPT

## 2023-02-07 NOTE — PROGRESS NOTES
PT DAILY TREATMENT NOTE - [facility]     Patient Name: Alma Garibay  Date:2023  : 1945  [x]  Patient  Verified  Payor: Enrrique Maravilla / Plan: [facility] [employer] MEDICARE CHOICE PPO/PFFS / Product Type: Managed Care Medicare /    In time:1630  Out time:1700  Total Treatment Time (min): 30  Total Timed Codes (min): 30   1:1 Treatment Time ( [address] only): 30   Visit #: 15 of 24    Treatment Area: Pain in right knee [M25.561]  Other abnormalities of gait and mobility [R26.89]    SUBJECTIVE  Pain Level (0-10 scale): 0  Any medication changes, allergies to medications, adverse drug reactions, diagnosis change, or new procedure performed?: [x] No    [] Yes (see summary sheet for update)  Subjective functional status/changes:   [] No changes reported    Patient reports compliance with HEP. She states that she is feeling safer when ambulating in her home. She states that she is tired in the afternoon. OBJECTIVE    15 min Therapeutic Exercise:  [x] See flow sheet :   Rationale: increase ROM, increase strength and decrease pain to improve the patients ability to complete ADLs    15 min Neuromuscular Re-education:  [x]  See flow sheet :   Rationale: improve coordination, improve balance and increase proprioception  to improve the patients ability to complete ADLs          With   [x] TE   [] TA   [] neuro   [] other: Patient Education: [x] Review HEP    [] Progressed/Changed HEP based on:   [] positioning   [] body mechanics   [] transfers   [] heat/ice application    [] other:      Other Objective/Functional Measures: AN     Pain Level (0-10 scale) post treatment: 0    ASSESSMENT/Changes in Function:   Patient responds well to treatment session. Patient required close supervision for safety with balance activities. She was challenged with exercise prescribed as she reported feeling tired due to treatment in the afternoon. Will advance exercise next visit.   No adverse effects were noted from today's treatment session    Patient will continue to benefit from skilled PT services to modify and progress therapeutic interventions, address functional mobility deficits, address ROM deficits, address strength deficits, analyze and address soft tissue restrictions, analyze and cue movement patterns, analyze and modify body mechanics/ergonomics, assess and modify postural abnormalities, address imbalance and instruct in home and community integration to attain remaining goals. []  See Plan of Care  []  See progress note/recertification  []  See Discharge Summary         Progress towards goals / Updated goals:  Short Term Goals:    to be accomplished within 8 treatments:     Patient will be compliant and independent with prescribed HEP(s) in order to assist in maximizing therapeutic gains and improving overall function. Eval: HEP to be issued and reviewed with patient within 1-2 visits  Current: Met, 2/1/2023     Patient will be able to demonstrate functional trunk AROM in all planes without any LOB and good weight shift in order to reduce risk of falls during activities such as bending forward to  something on the floor. Eval: Trunk AROM: WFL in all planes, asymptomatic though one posterior LOB when returning from flexion to neutral position requiring stepping strategy     Patient will be able to perform at least 12-17 reps of sit <> stands during 30\" STS test to demonstrate improved LE strength and stability during this functional activity, thus reducing the patients risk of falls. Eval: unable to test today as patient had to leave eval early, will test on second visit   Current: 6 repetitions. 12/21/2022, in progress     Long Term Goals:     to be accomplished within 16 treatments:     Patient will score at least 51 points on FOTO in order to maximize function and promote patient satisfaction with overall outcome.   (FOTO is an established functional score where a score of 100 = no disability)  Eval: 40     Patient will demonstrate at least 4/5 strength bilaterally in order to improve safety with sit <> stands, stair negotiations, and walking. Eval: Grossly 5/5 bilaterally except bilateral hip flexion 4/5, bilateral hip abduction 2-/5, and bilateral hip extension 3+/5     Patient will be able to perform SLS (bilaterally) for at least 30 seconds in order to show a reduction in the risk of falls. Eval: unable to test today as patient had to leave eval early, will test on second visit              Current: SLS eyes open, left 3 seconds, right 3 seconds.      12/21/2022, in progress     PLAN  []  Upgrade activities as tolerated     [x]  Continue plan of care  []  Update interventions per flow sheet       []  Discharge due to:_  []  Other:_      Cheyanne Victoria PT, DPT 2/7/2023  4:45 PM    Future Appointments   Date Time Provider Mally Jamison   2/10/2023  8:00 AM MARINA Waldron [facility]   2/15/2023 11:00 AM Idolina Litten, PT [facility]   2/17/2023  8:00 AM Idolina Litten, PT [facility]   2/20/2023  9:30 AM MARINA Waldron [facility]   2/22/2023  9:30 AM MARINA Waldron [facility]

## 2023-02-10 ENCOUNTER — HOSPITAL ENCOUNTER (OUTPATIENT)
Dept: PHYSICAL THERAPY | Age: 78
Discharge: HOME | End: 2023-02-10
Payer: MEDICARE

## 2023-02-10 PROCEDURE — 97112 NEUROMUSCULAR REEDUCATION: CPT

## 2023-02-10 PROCEDURE — 97110 THERAPEUTIC EXERCISES: CPT

## 2023-02-10 NOTE — PROGRESS NOTES
PT DAILY TREATMENT NOTE - [facility]     Patient Name: Carrillo Santizo  Date:2/10/2023  : 1945  [x]  Patient  Verified  Payor: Janett Ramirez / Plan: [facility] MEDICARE CHOICE PPO/PFFS / Product Type: Managed Care Medicare /    In time:0800  Out time:0830  Total Treatment Time (min): 30  Total Timed Codes (min): 30   1:1 Treatment Time ( [address] only): 30   Visit #: 16 of 24    Treatment Area: Pain in right knee [M25.561]  Other abnormalities of gait and mobility [R26.89]    SUBJECTIVE  Pain Level (0-10 scale): 0-1  Any medication changes, allergies to medications, adverse drug reactions, diagnosis change, or new procedure performed?: [x] No    [] Yes (see summary sheet for update)  Subjective functional status/changes:   [] No changes reported    Patient reports no new changes since previous visit. OBJECTIVE    10 min Therapeutic Exercise:  [x] See flow sheet :   Rationale: increase ROM, increase strength and decrease pain to improve the patients ability to complete ADLs     20 min Neuromuscular Re-education:  [x]  See flow sheet :   Rationale: improve coordination, improve balance and increase proprioception  to improve the patients ability to complete ADLs    Other Objective/Functional Measures: NA     Pain Level (0-10 scale) post treatment: 0    ASSESSMENT/Changes in Function:     Patient responds well to treatment session. Introduced obstacle source and provided close supervision and minimal CGA for safety. She was challenged with exercise prescribed. Will continue to challenge balance as patient is transition toward independent management of her condition.  No adverse effects were noted from today's treatment session      Patient will continue to benefit from skilled PT services to modify and progress therapeutic interventions, address functional mobility deficits, address ROM deficits, address strength deficits, analyze and address soft tissue restrictions, analyze and cue movement patterns, analyze and modify body mechanics/ergonomics, assess and modify postural abnormalities, address imbalance and instruct in home and community integration to attain remaining goals. []  See Plan of Care  []  See progress note/recertification  []  See Discharge Summary         Progress towards goals / Updated goals:  Short Term Goals:    to be accomplished within 8 treatments:     Patient will be compliant and independent with prescribed HEP(s) in order to assist in maximizing therapeutic gains and improving overall function. Eval: HEP to be issued and reviewed with patient within 1-2 visits  Current: Met, 2/1/2023     Patient will be able to demonstrate functional trunk AROM in all planes without any LOB and good weight shift in order to reduce risk of falls during activities such as bending forward to  something on the floor. Eval: Trunk AROM: WFL in all planes, asymptomatic though one posterior LOB when returning from flexion to neutral position requiring stepping strategy   Current: Met, 2/10/2023     Patient will be able to perform at least 12-17 reps of sit <> stands during 30\" STS test to demonstrate improved LE strength and stability during this functional activity, thus reducing the patients risk of falls. Eval: unable to test today as patient had to leave eval early, will test on second visit   Current: 6 repetitions. 12/21/2022, in progress     Long Term Goals:     to be accomplished within 16 treatments:     Patient will score at least 51 points on FOTO in order to maximize function and promote patient satisfaction with overall outcome. (Zully Sergeant is an established functional score where a score of 100 = no disability)  Eval: 40     Patient will demonstrate at least 4/5 strength bilaterally in order to improve safety with sit <> stands, stair negotiations, and walking.   Eval: Grossly 5/5 bilaterally except bilateral hip flexion 4/5, bilateral hip abduction 2-/5, and bilateral hip extension 3+/5     Patient will be able to perform SLS (bilaterally) for at least 30 seconds in order to show a reduction in the risk of falls. Eval: unable to test today as patient had to leave eval early, will test on second visit              Current: SLS eyes open, left 3 seconds, right 3 seconds.      12/21/2022, in progress     PLAN  []  Upgrade activities as tolerated     [x]  Continue plan of care  []  Update interventions per flow sheet       []  Discharge due to:_  []  Other:_      Pao Johnston, PT, DPT 2/10/2023  8:00 AM    Future Appointments   Date Time Provider Mally Jamison   2/15/2023 11:00 AM Jose Miles, PT [facility]   2/17/2023  8:00 AM Jose Miles, PT [facility]   2/20/2023  9:30 AM Fabricio Able, PT [facility]   2/22/2023  9:30 AM Fabricio Able, PT [facility]

## 2023-02-15 ENCOUNTER — APPOINTMENT (OUTPATIENT)
Dept: PHYSICAL THERAPY | Age: 78
End: 2023-02-15
Payer: MEDICARE

## 2023-02-15 ENCOUNTER — HOSPITAL ENCOUNTER (OUTPATIENT)
Facility: HOSPITAL | Age: 78
Setting detail: RECURRING SERIES
Discharge: HOME | End: 2023-02-18
Payer: MEDICARE

## 2023-02-15 PROCEDURE — 97530 THERAPEUTIC ACTIVITIES: CPT

## 2023-02-15 PROCEDURE — 97110 THERAPEUTIC EXERCISES: CPT

## 2023-02-15 PROCEDURE — 97112 NEUROMUSCULAR REEDUCATION: CPT

## 2023-02-15 NOTE — PROGRESS NOTES
PHYSICAL / OCCUPATIONAL THERAPY - DAILY TREATMENT NOTE (updated )    Patient Name: Hector Villalpando    Date: 2/15/2023    : 1945  Insurance: Payor: Mikie Moe / Plan: [employer]ann[employer] Woo GOLD PLUS HMO / Product Type: *No Product type* /      Patient  verified Yes     Visit #   Current / Total 17 20   Time   In / Out 1104 1150   Pain   In / Out 0 0   Subjective Functional Status/Changes: \"The right knee has not been giving me any trouble lately and my balance is getting better with all the exercises we are doing. \"   Changes to:  Meds, Allergies, Med Hx, Sx Hx? If yes, update Summary List no       TREATMENT AREA =  No admission diagnoses are documented for this encounter. OBJECTIVE    Therapeutic Procedures: Tx Min Billable or 1:1 Min (if diff from Tx Min) Procedure, Rationale, Specifics   15 21 61195 Therapeutic Exercise (timed):  increase ROM, strength, coordination, balance, and proprioception to improve patient's ability to progress to PLOF and address remaining functional goals. (see flow sheet as applicable)     Details if applicable:       15  21056 Neuromuscular Re-Education (timed):  improve balance, coordination, kinesthetic sense, posture, core stability and proprioception to improve patient's ability to develop conscious control of individual muscles and awareness of position of extremities in order to progress to PLOF and address remaining functional goals. (see flow sheet as applicable)     Details if applicable:     10  75882 Therapeutic Activity (timed):  use of dynamic activities replicating functional movements to increase ROM, strength, coordination, balance, and proprioception in order to improve patient's ability to progress to PLOF and address remaining functional goals.   (see flow sheet as applicable)     Details if applicable:            Details if applicable:            Details if applicable:     40 55 [facility] Totals Reminder: bill using total billable min of TIMED therapeutic procedures (example: do not include dry needle or estim unattended, both untimed codes, in totals to left)  8-22 min = 1 unit; 23-37 min = 2 units; 38-52 min = 3 units; 53-67 min = 4 units; 68-82 min = 5 units   Total Total     [x]  Patient Education billed concurrently with other procedures   [x] Review HEP    [] Progressed/Changed HEP, detail:    [] Other detail:       Objective Information/Functional Measures/Assessment    Patient is steadily progressing towards independence in comprehensive strength and balance HEP. Patient in process of learning appropriate obstacle course and functional dynamic balance activities. Balance reactions diminish as patient fatigues indicating need to continue improving strength and endurance as well as balance. Patient will continue to benefit from skilled PT / OT services to modify and progress therapeutic interventions, analyze and address functional mobility deficits, analyze and address ROM deficits, analyze and address strength deficits, analyze and address soft tissue restrictions, analyze and cue for proper movement patterns, analyze and modify for postural abnormalities, analyze and address imbalance/dizziness, and instruct in home and community integration to address functional deficits and attain remaining goals. Progress toward goals / Updated goals:  []  See Progress Note/Recertification    Short Term Goals:    to be accomplished within 8 treatments:     Patient will be compliant and independent with prescribed HEP(s) in order to assist in maximizing therapeutic gains and improving overall function. Eval: HEP to be issued and reviewed with patient within 1-2 visits  Current: Met, 2/1/2023     Patient will be able to demonstrate functional trunk AROM in all planes without any LOB and good weight shift in order to reduce risk of falls during activities such as bending forward to  something on the floor.               Eval: Trunk AROM: WFL in all planes, asymptomatic though one posterior LOB when returning from flexion to neutral position requiring stepping strategy              Current: Met, 2/10/2023     Patient will be able to perform at least 12-17 reps of sit <> stands during 30\" STS test to demonstrate improved LE strength and stability during this functional activity, thus reducing the patients risk of falls. Eval: unable to test today as patient had to leave eval early, will test on second visit   Current: 6 repetitions. 12/21/2022, in progress     Long Term Goals:     to be accomplished within 16 treatments:     Patient will score at least 51 points on FOTO in order to maximize function and promote patient satisfaction with overall outcome. (Wynetta Nigel is an established functional score where a score of 100 = no disability)  Eval: 40     Patient will demonstrate at least 4/5 strength bilaterally in order to improve safety with sit <> stands, stair negotiations, and walking. Eval: Grossly 5/5 bilaterally except bilateral hip flexion 4/5, bilateral hip abduction 2-/5, and bilateral hip extension 3+/5     Patient will be able to perform SLS (bilaterally) for at least 30 seconds in order to show a reduction in the risk of falls. Eval: unable to test today as patient had to leave eval early, will test on second visit              Current: SLS eyes open, left 3 seconds, right 3 seconds.      12/21/2022, in progress       PLAN  Yes  Continue plan of care  []  Upgrade activities as tolerated  []  Discharge due to :  []  Other:    Barbara Do, PT    2/15/2023    11:11 AM    Future Appointments   Date Time Provider Lito Davis   2/17/2023  8:00 AM Barbara Do PT [facility]   2/20/2023  9:30 AM JAMIE Gómez [facility]   2/22/2023  9:30 AM Luis Le [facility]

## 2023-02-17 ENCOUNTER — APPOINTMENT (OUTPATIENT)
Dept: PHYSICAL THERAPY | Age: 78
End: 2023-02-17
Payer: MEDICARE

## 2023-02-20 ENCOUNTER — APPOINTMENT (OUTPATIENT)
Dept: PHYSICAL THERAPY | Age: 78
End: 2023-02-20
Payer: MEDICARE

## 2023-02-22 ENCOUNTER — HOSPITAL ENCOUNTER (OUTPATIENT)
Facility: HOSPITAL | Age: 78
Setting detail: RECURRING SERIES
Discharge: HOME | End: 2023-02-25
Payer: MEDICARE

## 2023-02-22 ENCOUNTER — APPOINTMENT (OUTPATIENT)
Dept: PHYSICAL THERAPY | Age: 78
End: 2023-02-22
Payer: MEDICARE

## 2023-02-22 PROCEDURE — 97110 THERAPEUTIC EXERCISES: CPT

## 2023-02-22 PROCEDURE — 97530 THERAPEUTIC ACTIVITIES: CPT

## 2023-02-22 PROCEDURE — 97112 NEUROMUSCULAR REEDUCATION: CPT

## 2023-02-22 NOTE — PROGRESS NOTES
PHYSICAL / OCCUPATIONAL THERAPY - DAILY TREATMENT NOTE (updated )    Patient Name: Geraldo Agarwal    Date: 2023    : 1945  Insurance: Payor: [id number] [facility] / Plan: [address] [facility] [employer] PLUS HMO / Product Type: *No Product type* /      Patient  verified Yes   Visit #   Current / Total 18 20   Time   In / Out 0932 1010   Pain   In / Out 0 0   Subjective Functional Status/Changes: Patient reports that she is doing well and has no new complaints. Changes to:  Meds, Allergies, Med Hx, Sx Hx? If yes, update Summary List no       TREATMENT AREA =  No admission diagnoses are documented for this encounter. OBJECTIVE        Therapeutic Procedures: Tx Min Billable or 1:1 Min (if diff from Tx Min) Procedure, Rationale, Specifics   10  93783 Therapeutic Exercise (timed):  increase ROM, strength, coordination, balance, and proprioception to improve patient's ability to progress to PLOF and address remaining functional goals. (see flow sheet as applicable)     Details if applicable:       8  39614 Therapeutic Activity (timed):  use of dynamic activities replicating functional movements to increase ROM, strength, coordination, balance, and proprioception in order to improve patient's ability to progress to PLOF and address remaining functional goals. (see flow sheet as applicable)     Details if applicable:     20  40098 Neuromuscular Re-Education (timed):  improve balance, coordination, kinesthetic sense, posture, core stability and proprioception to improve patient's ability to develop conscious control of individual muscles and awareness of position of extremities in order to progress to PLOF and address remaining functional goals.  (see flow sheet as applicable)     Details if applicable:     45   [facility] Totals Reminder: bill using total billable min of TIMED therapeutic procedures (example: do not include dry needle or estim unattended, both untimed codes, in totals to left)  8-22 min = 1 unit; 23-37 min = 2 units; 38-52 min = 3 units; 53-67 min = 4 units; 68-82 min = 5 units   Total Total     [x]  Patient Education billed concurrently with other procedures   [x] Review HEP    [] Progressed/Changed HEP, detail:    [] Other detail:       Objective Information/Functional Measures/Assessment    Patient demonstrated improved balance during obstacle course. Provided close supervision for safety. Will advance HEP and perform reassessment for potential discharge next visit. Patient will continue to benefit from skilled PT / OT services to modify and progress therapeutic interventions, analyze and address functional mobility deficits, analyze and address ROM deficits, analyze and address strength deficits, analyze and address soft tissue restrictions, analyze and cue for proper movement patterns, analyze and modify for postural abnormalities, analyze and address imbalance/dizziness, and instruct in home and community integration to address functional deficits and attain remaining goals. Progress toward goals / Updated goals:  []  See Progress Note/Recertification    Short Term Goals:    to be accomplished within 8 treatments:     Patient will be compliant and independent with prescribed HEP(s) in order to assist in maximizing therapeutic gains and improving overall function. Eval: HEP to be issued and reviewed with patient within 1-2 visits  Current: Met, 2/1/2023     Patient will be able to demonstrate functional trunk AROM in all planes without any LOB and good weight shift in order to reduce risk of falls during activities such as bending forward to  something on the floor.               Eval: Trunk AROM: WFL in all planes, asymptomatic though one posterior LOB when returning from flexion to neutral position requiring stepping strategy              Current: Met, 2/10/2023     Patient will be able to perform at least 12-17 reps of sit <> stands during 30\" STS test to demonstrate improved LE strength and stability during this functional activity, thus reducing the patients risk of falls. Eval: unable to test today as patient had to leave eval early, will test on second visit   Current: 6 repetitions. 12/21/2022, in progress     Long Term Goals:     to be accomplished within 16 treatments:     Patient will score at least 51 points on FOTO in order to maximize function and promote patient satisfaction with overall outcome. (Fransisca Shames is an established functional score where a score of 100 = no disability)  Eval: 40     Patient will demonstrate at least 4/5 strength bilaterally in order to improve safety with sit <> stands, stair negotiations, and walking. Eval: Grossly 5/5 bilaterally except bilateral hip flexion 4/5, bilateral hip abduction 2-/5, and bilateral hip extension 3+/5     Patient will be able to perform SLS (bilaterally) for at least 30 seconds in order to show a reduction in the risk of falls. Eval: unable to test today as patient had to leave eval early, will test on second visit              Current: Met, 2/22/2023    PLAN  Yes  Continue plan of care  []  Upgrade activities as tolerated  []  Discharge due to :  []  Other:    Bj Mark, PT    2/22/2023    9:49 AM    No future appointments.

## 2023-06-28 ENCOUNTER — HOSPITAL ENCOUNTER (OUTPATIENT)
Facility: HOSPITAL | Age: 78
Setting detail: RECURRING SERIES
Discharge: HOME | End: 2023-07-01
Payer: MEDICARE

## 2023-06-28 PROCEDURE — 97161 PT EVAL LOW COMPLEX 20 MIN: CPT

## 2023-07-05 ENCOUNTER — HOSPITAL ENCOUNTER (OUTPATIENT)
Facility: HOSPITAL | Age: 78
Setting detail: RECURRING SERIES
Discharge: HOME | End: 2023-07-08
Payer: MEDICARE

## 2023-07-05 PROCEDURE — 97112 NEUROMUSCULAR REEDUCATION: CPT

## 2023-07-05 PROCEDURE — 97110 THERAPEUTIC EXERCISES: CPT

## 2023-07-05 PROCEDURE — 97530 THERAPEUTIC ACTIVITIES: CPT

## 2023-07-05 NOTE — PROGRESS NOTES
PHYSICAL / OCCUPATIONAL THERAPY - DAILY TREATMENT NOTE (updated )    Patient Name: Loida Espinoza    Date: 2023    : 1945  Insurance: Payor: Wilma Hermosillo / Plan: [employer] GOLD PLUS HMO / Product Type: *No Product type* /      Patient  verified Yes     Visit #   Current / Total 2 12   Time   In / Out 1:20pm 2:00pm   Pain   In / Out 0 0   Subjective Functional Status/Changes: Pt unsure if she is doing exercises correctly; prefers exercises sent online  She was conscious of staggered posture with gardening and trying to increase water intake   Endorses frequent falls in her garden when mis stepping, attributes to bifocals   Changes to: Allergies, Med Hx, Sx Hx?   no       TREATMENT AREA =  Other symptoms and signs involving the genitourinary system [R39.89]    OBJECTIVE    Therapeutic Procedures: Tx Min Billable or 1:1 Min (if diff from Tx Min) Procedure, Rationale, Specifics   15  71153 Therapeutic Exercise (timed):  increase ROM, strength, coordination, balance, and proprioception to improve patient's ability to progress to PLOF and address remaining functional goals. (see flow sheet as applicable)     Details if applicable:       15  24641 Neuromuscular Re-Education (timed):  improve balance, coordination, kinesthetic sense, posture, core stability and proprioception to improve patient's ability to develop conscious control of individual muscles and awareness of position of extremities in order to progress to PLOF and address remaining functional goals. (see flow sheet as applicable)     Details if applicable:     10  54039 Therapeutic Activity (timed):  use of dynamic activities replicating functional movements to increase ROM, strength, coordination, balance, and proprioception in order to improve patient's ability to progress to PLOF and address remaining functional goals.   (see flow sheet as applicable)     Details if applicable:  see edu below          Details if applicable:

## 2023-07-11 ENCOUNTER — HOSPITAL ENCOUNTER (OUTPATIENT)
Facility: HOSPITAL | Age: 78
Setting detail: RECURRING SERIES
Discharge: HOME | End: 2023-07-14
Payer: MEDICARE

## 2023-07-11 PROCEDURE — 97110 THERAPEUTIC EXERCISES: CPT

## 2023-07-11 PROCEDURE — 97535 SELF CARE MNGMENT TRAINING: CPT

## 2023-07-11 PROCEDURE — 97530 THERAPEUTIC ACTIVITIES: CPT

## 2023-07-11 PROCEDURE — 97112 NEUROMUSCULAR REEDUCATION: CPT

## 2023-07-11 NOTE — PROGRESS NOTES
PHYSICAL / OCCUPATIONAL THERAPY - DAILY TREATMENT NOTE (updated )    Patient Name: Mitchell Madden    Date: 2023    : 1945  Insurance: Payor: Lesly Caldera / Plan: [employer] GOLD PLUS HMO / Product Type: *No Product type* /      Patient  verified Yes     Visit #   Current / Total 3 12   Time   In / Out 11:25am 12:20pm   Pain   In / Out 0 0   Subjective Functional Status/Changes: Pt reports that its hard to tell if pressure is changing but legs up on the wall gives a little relief at the end of the day   Pt was able to replace pessary; she doesn't feel it usually but she lifted something heavy and felt it move and start to bulge out  She's been drinking more water when she takes her meds  No longer having issues with starting urine stream or burning urination    Changes to: Allergies, Med Hx, Sx Hx?   no       TREATMENT AREA =  Other symptoms and signs involving the genitourinary system [R39.89]    OBJECTIVE    Therapeutic Procedures: Tx Min Billable or 1:1 Min (if diff from Tx Min) Procedure, Rationale, Specifics   20  59655 Therapeutic Exercise (timed):  increase ROM, strength, coordination, balance, and proprioception to improve patient's ability to progress to PLOF and address remaining functional goals. (see flow sheet as applicable)     Details if applicable:       15  80100 Neuromuscular Re-Education (timed):  improve balance, coordination, kinesthetic sense, posture, core stability and proprioception to improve patient's ability to develop conscious control of individual muscles and awareness of position of extremities in order to progress to PLOF and address remaining functional goals.  (see flow sheet as applicable)     Details if applicable:     10  98484 Self Care/Home Management (timed):  improve patient knowledge and understanding of pain reducing techniques, positioning, posture/ergonomics, diagnosis/prognosis, physical therapy expectations, procedures and progression, and fluid

## 2023-07-28 ENCOUNTER — HOSPITAL ENCOUNTER (OUTPATIENT)
Facility: HOSPITAL | Age: 78
Setting detail: RECURRING SERIES
Discharge: HOME | End: 2023-07-31
Payer: MEDICARE

## 2023-07-28 PROCEDURE — 97112 NEUROMUSCULAR REEDUCATION: CPT

## 2023-07-28 PROCEDURE — 97110 THERAPEUTIC EXERCISES: CPT

## 2023-07-28 PROCEDURE — 97530 THERAPEUTIC ACTIVITIES: CPT

## 2023-07-28 NOTE — PROGRESS NOTES
[facility] at the [place] [address] [address]  Phone: [phone]      Fax:  [phone]    Progress Note    Patient name: Sagrario Vazquez Start of Care: 2023   Referral source: Zeyad Simon MD : 1945               Medical Diagnosis: Incomplete uterovaginal prolapse [N81.2]        Onset Date:2021               Treatment Diagnosis: R39.89  Other signs and symptoms of genitourinary system                            Prior Hospitalization: see medical history Provider#: [id number]   Medications: Verified on Patient Summary List      Comorbidities: Arthritis, HTN, Hx stroke, and Hemorrhoids, hx 3 pregnancies/deliveries,  Polymyositis. History of falling. Right medial knee pain. Right shoulder pain. Prior Level of Function: Prior to , patient denies any urinary voiding discomfort, activity limitation 2/2 pelvic pressure, or vaginal discomfort     Visits from Start of Care: 4    Missed Visits: 1    Updated Goals/Measure of Progress: To be achieved in 8 weeks:    Short Term Goals: To be accomplished in 4 weeks:  1) HEP- Patient will be independent and compliant with HEP to progress toward goals and restore functional mobility. Eval Status: issued at eval   PN Status 2023: Progressing - good compliance but requires min-mod cueing for proper performance of many activities; reviewed and progressed today     2) QOL- Pt will report 2 point reduction in the severity of their problem on a scale of 1-10 in terms of how significantly their complaint impacts their quality of life. Eval status: rates severity 10/10; increased pelvic pressure with walking around stores, picking up heavy things. PN Status 2023: Slowly progressing - 9/10     3) FOTO- Patient will improve FOTO score by 10 points for improved self-efficacy in managing symptoms and improved QOL.    Eval Status: FOTO 48  PN Status 2023:  will reassess at visit
35429 Therapeutic Activity (timed):  use of dynamic activities replicating functional movements to increase ROM, strength, coordination, balance, and proprioception in order to improve patient's ability to progress to PLOF and address remaining functional goals. (see flow sheet as applicable)    Details if applicable:  POC, progress made, remaining impairments, prognosis, HEP progressions          Details if applicable:     36  [facility] Totals Reminder: bill using total billable min of TIMED therapeutic procedures (example: do not include dry needle or estim unattended, both untimed codes, in totals to left)  8-22 min = 1 unit; 23-37 min = 2 units; 38-52 min = 3 units; 53-67 min = 4 units; 68-82 min = 5 units   Total Total     TOTAL TREATMENT TIME:        40     [x]  Patient Education billed concurrently with other procedures   [x] Review HEP    [] Progressed/Changed HEP, detail:    [] Other detail:       Objective Information/Functional Measures/Assessment    Pt is making reasonable progress in PT to address uterovaginal prolapse and urinary voiding dysfunction. They voice subjective improvements in severity of symptoms impact on QOL. She had previously reported notable improvements in urinary voiding dysfunction and painful urination but these sxs have returned since experiencing a respiratory bacterial infection; pt reports minimal HEP engagement and missed therapy appointment over last two weeks 2/2 illness. They demo objective improvements in SLS balance bilat, hip ROM bilat. See updated goals for further details. Barriers to progress include patient absence from PT and disengagement with HEP over last 2 weeks due to bacterial infection and illness. Pt cont to be limited by remaining pelvic pressure with ADLs and yard work, remaining urinary voiding dysfunction, highly limited squat depth. Reviewed and revised HEP to better address remaining impairments.      Observed ankle stiffness limiting squat

## 2023-08-03 ENCOUNTER — HOSPITAL ENCOUNTER (OUTPATIENT)
Facility: HOSPITAL | Age: 78
Setting detail: RECURRING SERIES
Discharge: HOME | End: 2023-08-06
Payer: MEDICARE

## 2023-08-03 PROCEDURE — 97110 THERAPEUTIC EXERCISES: CPT

## 2023-08-03 PROCEDURE — 97530 THERAPEUTIC ACTIVITIES: CPT

## 2023-08-03 PROCEDURE — 97112 NEUROMUSCULAR REEDUCATION: CPT

## 2023-08-03 NOTE — PROGRESS NOTES
PHYSICAL / OCCUPATIONAL THERAPY - DAILY TREATMENT NOTE (updated )    Patient Name: Kateryna Abbasi    Date: 8/3/2023    : 1945  Insurance: Payor: Michael Hoover / Plan: [employer] GOLD PLUS HMO / Product Type: *No Product type* /      Patient  verified Yes     Visit #   Current / Total 5 12   Time   In / Out 1:34pm 2:35pm   Pain   In / Out 0 0   Subjective Functional Status/Changes: Pt was able to get her pessary back in; states that its helping  Reports getting worse with exercises; clam shells, bridges, SLR, leg lifts, ADD squeezes  Reports she was still having    Changes to: Allergies, Med Hx, Sx Hx?   no       TREATMENT AREA =  Other symptoms and signs involving the genitourinary system [R39.89]    OBJECTIVE    Therapeutic Procedures: Tx Min Billable or 1:1 Min (if diff from Tx Min) Procedure, Rationale, Specifics   30  56298 Therapeutic Exercise (timed):  increase ROM, strength, coordination, balance, and proprioception to improve patient's ability to progress to PLOF and address remaining functional goals. (see flow sheet as applicable)     Details if applicable:       10  99097 Neuromuscular Re-Education (timed):  improve balance, coordination, kinesthetic sense, posture, core stability and proprioception to improve patient's ability to develop conscious control of individual muscles and awareness of position of extremities in order to progress to PLOF and address remaining functional goals. (see flow sheet as applicable)     Details if applicable:     21  04512 Therapeutic Activity (timed):  use of dynamic activities replicating functional movements to increase ROM, strength, coordination, balance, and proprioception in order to improve patient's ability to progress to PLOF and address remaining functional goals. (see flow sheet as applicable)     Details if applicable:  ergonomics and positioning to reduce pelvic pressure.  Reviewed HEP progressions and provided handout to patient

## 2023-08-08 ENCOUNTER — TELEPHONE (OUTPATIENT)
Facility: HOSPITAL | Age: 78
End: 2023-08-08

## 2023-08-08 NOTE — PROGRESS NOTES
PHYSICAL / OCCUPATIONAL THERAPY - DAILY TREATMENT NOTE (updated )    Patient Name: Krista Álvarez    Date: 2023  ***  : 1945  Insurance: Payor: Akua Jo / Plan: Igor Staple PLUS HMO / Product Type: *No Product type* /      Patient  verified Yes     Visit #   Current / Total 6 12   Time   In / Out *** ***   Pain   In / Out 0 0   Subjective Functional Status/Changes: Pt was able to get her pessary back in; states that its helping  Reports getting worse with exercises; clam shells, bridges, SLR, leg lifts, ADD squeezes  Reports she was still having   ***     Changes to: Allergies, Med Hx, Sx Hx?   no       TREATMENT AREA =  Other symptoms and signs involving the genitourinary system [R39.89]    OBJECTIVE    Therapeutic Procedures: Tx Min Billable or 1:1 Min (if diff from Tx Min) Procedure, Rationale, Specifics   30  55240 Therapeutic Exercise (timed):  increase ROM, strength, coordination, balance, and proprioception to improve patient's ability to progress to PLOF and address remaining functional goals. (see flow sheet as applicable)     Details if applicable:       10  74558 Neuromuscular Re-Education (timed):  improve balance, coordination, kinesthetic sense, posture, core stability and proprioception to improve patient's ability to develop conscious control of individual muscles and awareness of position of extremities in order to progress to PLOF and address remaining functional goals. (see flow sheet as applicable)     Details if applicable:       54343 Therapeutic Activity (timed):  use of dynamic activities replicating functional movements to increase ROM, strength, coordination, balance, and proprioception in order to improve patient's ability to progress to PLOF and address remaining functional goals. (see flow sheet as applicable)     Details if applicable:  ergonomics and positioning to reduce pelvic pressure.  Reviewed HEP progressions and provided handout to patient stability with gait, work-related tasks, stair negotiation, and ADLs. Eval status: right- 8 sec with mod-max postural sway. Left 2 sec with max  postural sway. PN Status 7/28/2023: Progressing - right-12 sec with mod-max postural sway. Left- 8 sec with mod postural sway.     PLAN  Yes  Continue plan of care  []  Upgrade activities as tolerated  []  Discharge due to :  []  Other:    Domo Taylor PT    8/8/2023    2:04 PM    Future Appointments   Date Time Provider 4600  [address]   8/9/2023 10:30 AM Domo Taylor, PT [facility]   8/23/2023  8:30 AM Domo Fucjeniffer, PT [facility]   8/30/2023  8:30 AM Domo Fuchs, PT [facility]   9/6/2023  8:30 AM Domo Fuchs, PT [facility]   9/13/2023  8:30 AM Domo Fuchs, PT [facility]   9/20/2023  8:30 AM Domo Fuchs, PT [facility]   9/27/2023  8:30 AM Domo Fuchs, PT [facility]

## 2023-08-09 ENCOUNTER — HOSPITAL ENCOUNTER (OUTPATIENT)
Facility: HOSPITAL | Age: 78
Setting detail: RECURRING SERIES
End: 2023-08-09
Payer: MEDICARE

## 2023-08-23 ENCOUNTER — HOSPITAL ENCOUNTER (OUTPATIENT)
Facility: HOSPITAL | Age: 78
Setting detail: RECURRING SERIES
Discharge: HOME | End: 2023-08-26
Payer: MEDICARE

## 2023-08-23 PROCEDURE — 97110 THERAPEUTIC EXERCISES: CPT

## 2023-08-23 PROCEDURE — 97530 THERAPEUTIC ACTIVITIES: CPT

## 2023-08-23 PROCEDURE — 97112 NEUROMUSCULAR REEDUCATION: CPT

## 2023-08-23 NOTE — PROGRESS NOTES
positioning to reduce pelvic pressure. Reviewed HEP progressions and provided handout to patient          Details if applicable:            Details if applicable:     54  [facility] BC Totals Reminder: bill using total billable min of TIMED therapeutic procedures (example: do not include dry needle or estim unattended, both untimed codes, in totals to left)  8-22 min = 1 unit; 23-37 min = 2 units; 38-52 min = 3 units; 53-67 min = 4 units; 68-82 min = 5 units   Total Total     TOTAL TREATMENT TIME:        55     [x]  Patient Education billed concurrently with other procedures   [x] Review HEP    [] Progressed/Changed HEP, detail:    [] Other detail:       Objective Information/Functional Measures/Assessment    Continued with belt-assisted lateral hip distraction; patient voiced reduction in pressure following this activity. She lost handout for most recent HEP additions so reviewed with her today and provided a new handout. Cues for hip shift prior to lift with sidelying clams to optimize glute max strengthening    Educated patient on ergonomic positioning and body mechanics for gardening with reduced pelvic pressure. Rec that she use WBOS and split stance for lifting. Carry over of HEP and pt edu limited 2/2 patient memory impairment and report of frequently losing paper handouts. Provided new handout of HEP and edu today.        Next session plan:   Cont with incline stretch, DF mobs, belt assisted hip mobs f/b glute max strengthening  Return to upright strengthening (hip hinge squats, single-leg stability)      Patient will continue to benefit from skilled PT / OT services to modify and progress therapeutic interventions, analyze and address functional mobility deficits, analyze and address ROM deficits, analyze and address strength deficits, analyze and address soft tissue restrictions, analyze and cue for proper movement patterns, analyze and modify for postural abnormalities, analyze and address imbalance/dizziness, and

## 2023-08-30 ENCOUNTER — APPOINTMENT (OUTPATIENT)
Facility: HOSPITAL | Age: 78
End: 2023-08-30
Payer: MEDICARE

## 2023-09-06 ENCOUNTER — HOSPITAL ENCOUNTER (OUTPATIENT)
Facility: HOSPITAL | Age: 78
Setting detail: RECURRING SERIES
Discharge: HOME | End: 2023-09-09
Payer: MEDICARE

## 2023-09-06 PROCEDURE — 97530 THERAPEUTIC ACTIVITIES: CPT

## 2023-09-06 PROCEDURE — 97110 THERAPEUTIC EXERCISES: CPT

## 2023-09-06 PROCEDURE — 97140 MANUAL THERAPY 1/> REGIONS: CPT

## 2023-09-06 PROCEDURE — 97112 NEUROMUSCULAR REEDUCATION: CPT

## 2023-09-06 NOTE — PROGRESS NOTES
PHYSICAL / OCCUPATIONAL THERAPY - DAILY TREATMENT NOTE (updated )    Patient Name: Rhona Gómez    Date: 2023    : 1945  Insurance: Payor: Dorian Espinoza / Plan: Francesca HARRIS HMO / Product Type: *No Product type* /      Patient  verified Yes     Visit #   Current / Total 7 12   Time   In / Out 8:35am 9:30am   Pain   In / Out 0 0   Subjective Functional Status/Changes: Pt reports that she had two \"horrible\" days last week where she couldn't get her bladder to stay up even with pessary in. Having lots of difficulty with starting urine stream   Been avoiding heavy lifting  After sleeping for 10 hours she had a full day of relief  No pressure right now but typically it will happen at end of day   Changes to: Allergies, Med Hx, Sx Hx?   no       TREATMENT AREA =  Other symptoms and signs involving the genitourinary system [R39.89]    OBJECTIVE    Therapeutic Procedures: Tx Min Billable or 1:1 Min (if diff from Tx Min) Procedure, Rationale, Specifics   10  71824 Therapeutic Exercise (timed):  increase ROM, strength, coordination, balance, and proprioception to improve patient's ability to progress to PLOF and address remaining functional goals. (see flow sheet as applicable)     Details if applicable:  kegels with ADD ball squeeze     15  45870 Neuromuscular Re-Education (timed):  improve balance, coordination, kinesthetic sense, posture, core stability and proprioception to improve patient's ability to develop conscious control of individual muscles and awareness of position of extremities in order to progress to PLOF and address remaining functional goals. (see flow sheet as applicable)     Details if applicable:  tactile and verbal cueing to improve motor control of PFM   10  77866 Manual Therapy (timed):  increase ROM, increase tissue extensibility, and increase postural awareness to improve patient's ability to progress to PLOF and address remaining functional goals.   The manual therapy
start of care. They demo objective improvements in squat depth and mechanics since last progress note; suspect impaired mechanics contribute to prolapse sxs with gardening. See updated goals for further details. Barriers to progress include intermittent attendance due to scheduling barriers with patient and provider; anticipate further progress over next month now that patient is scheduled at recommended frequency. Other barriers include cognitive and memory impairments 2/2 hx of stroke that limit patients ability to understand HEP cues. Pt cont to be limited by remaining uterovaginal prolapse symptoms and urinary dysfunction. Performed internal vaginal exam today now that patient has provided verbal consent;  observed 1/5MMT with severely impaired coordination, delayed and incomplete relaxation, and abdominal and gluteal substitution patterns. No palpable descent of PFM in response to diaphragmatic breathing. Her ability to perform kegel improved marginally by end of session. Plan to initiate biofeedback training at next visit. Reviewed and revised HEP to better address remaining impairments. Educated pt re: internal vaginal examination findings, POC, HEP revisions. Patient will continue to benefit from skilled PT / OT services to modify and progress therapeutic interventions, analyze and address functional mobility deficits, analyze and address ROM deficits, analyze and address strength deficits, analyze and address soft tissue restrictions, analyze and cue for proper movement patterns, analyze and modify for postural abnormalities, analyze and address imbalance/dizziness, and instruct in home and community integration to address functional deficits and attain remaining goals. ASSESSMENT/RECOMMENDATIONS:  Continue per plan of care.      Thank you for this referral.   Camille Yanez, PT 9/6/2023 8:29 AM

## 2023-09-12 NOTE — PROGRESS NOTES
PHYSICAL / OCCUPATIONAL THERAPY - DAILY TREATMENT NOTE (updated )    Patient Name: Carletha Gitelman    Date: 2023    : 1945  Insurance: Payor: Micki Pacer / Plan: [employer] GOLD PLUS HMO / Product Type: *No Product type* /      Patient  verified Yes     Visit #   Current / Total 8 12   Time   In / Out 8:30am 9:30am   Pain   In / Out 0 0   Subjective Functional Status/Changes: Pt reports increased vaginal pressure with walking this week  Believes pessary may have gotten out of place; she is not confident she got it back into the right spot but is having less pressure today than the past two days  Has OBGYN appointment next Monday   Changes to: Allergies, Med Hx, Sx Hx?   no       TREATMENT AREA =  Other symptoms and signs involving the genitourinary system [R39.89]    OBJECTIVE    Therapeutic Procedures: Tx Min Billable or 1:1 Min (if diff from Tx Min) Procedure, Rationale, Specifics   10  65274 Therapeutic Exercise (timed):  increase ROM, strength, coordination, balance, and proprioception to improve patient's ability to progress to PLOF and address remaining functional goals. (see flow sheet as applicable)     Details if applicable:  kegels with/without ADD ball squeeze in sitting and standing, bridges     35  19788 Neuromuscular Re-Education (timed):  improve balance, coordination, kinesthetic sense, posture, core stability and proprioception to improve patient's ability to develop conscious control of individual muscles and awareness of position of extremities in order to progress to PLOF and address remaining functional goals. (see flow sheet as applicable)     Details if applicable:  biofeedback training (see below)   NP  73249 Manual Therapy (timed):  increase ROM, increase tissue extensibility, and increase postural awareness to improve patient's ability to progress to PLOF and address remaining functional goals.   The manual therapy interventions were performed at a separate and

## 2023-09-13 ENCOUNTER — HOSPITAL ENCOUNTER (OUTPATIENT)
Facility: HOSPITAL | Age: 78
Setting detail: RECURRING SERIES
Discharge: HOME | End: 2023-09-16
Payer: MEDICARE

## 2023-09-13 PROCEDURE — 97530 THERAPEUTIC ACTIVITIES: CPT

## 2023-09-13 PROCEDURE — 97112 NEUROMUSCULAR REEDUCATION: CPT

## 2023-09-13 PROCEDURE — 97110 THERAPEUTIC EXERCISES: CPT

## 2023-09-20 ENCOUNTER — HOSPITAL ENCOUNTER (OUTPATIENT)
Facility: HOSPITAL | Age: 78
Setting detail: RECURRING SERIES
Discharge: HOME | End: 2023-09-23
Payer: MEDICARE

## 2023-09-20 PROCEDURE — 97530 THERAPEUTIC ACTIVITIES: CPT

## 2023-09-20 PROCEDURE — 97112 NEUROMUSCULAR REEDUCATION: CPT

## 2023-09-20 PROCEDURE — 97110 THERAPEUTIC EXERCISES: CPT

## 2023-09-20 NOTE — PROGRESS NOTES
[facility] at the [place] [address] [address]  Phone: [phone]      Fax:  [phone]    Continued Plan of Care/ Re-certification for Physical Therapy Services    Patient name: Loida Espinoza Start of Care: 2023   Referral source: Max Garcia MD : 1945               Medical Diagnosis: Incomplete uterovaginal prolapse [N81.2]        Onset Date:2021               Treatment Diagnosis: R39.89  Other signs and symptoms of genitourinary system                            Prior Hospitalization: see medical history Provider#: [id number]   Medications: Verified on Patient Summary List      Comorbidities: Arthritis, HTN, Hx stroke, and Hemorrhoids, hx 3 pregnancies/deliveries,  Polymyositis. History of falling. Right medial knee pain. Right shoulder pain. Prior Level of Function: Prior to , patient denies any urinary voiding discomfort, activity limitation 2/2 pelvic pressure, or vaginal discomfort     Visits from Start of Care: 9    Missed Visits: 2    Reporting Period: 23 to 23    The Plan of Care and following information is based on the patient's current status:    Key functional changes: Pt is making good progress in PT to address urinary voiding dysfunction and pelvic pressure 2/2 uterovaginal prolapse. They voice subjective improvements in: severity of sxs; activity tolerance; self-perceived disability status 2/2 sxs; ability to urinate without pain or difficulty. She reports feeling optimistic about progress with symptoms this week after improving coordination of pelvic floor muscles with biofeedback training and after pessary was reinserted by her OBGYN on Monday. They demo objective improvements in hip mobility, single-leg stability, squat depth and mechanics (to aid in gardening and household chores); and coordination of PFM and breathing mechanics.  Recently initiated biofeedback training with external
Status 9/6/2023: Progressing - per internal vaginal exam today; observed 1/5MMT with severely impaired coordination, delayed and incomplete relaxation, and abdominal and gluteal substitution patterns. No palpable descent of PFM in response to diaphragmatic breathing  PN Status 9/20/2023: Progressing - per sEMG biofeedback training on visit 8 and 9; patient making observable progress in coordination of pelvic floor muscles in seated position; cont to rely on co-contraction with hip rotators or adductors to facilitate contraction and relaxation; cont to benefit from max cues for coordination of breath and movement. Demos elevated resting tone of 7mV today; reduced to 4mV with balloon inflation activity     4) FUNCTIONAL- Pt will demo ability to perform pain-free double-leg squat 10 x to 18'' depth (standard height chair) with maintenance of neutral lumbar spine, equal weight bearing, and ankle awareness for improved ability to perform ADLs and household chore related tasks. Eval status: right lateral shift, minimal depth, narrow stance, pain-limited; 25'' depth (measured from ischial tuberosities)  PN Status 7/28/2023: No change since last PN -  25'' depth; early loss of heel contact  PN Status 9/6/2023: Progressing - 24'' depth; able to maintain heel contact  PN Status 9/20/2023: not formally reassessed today     5) FUNCTIONAL- Pt will demo ability to perform single-leg stance for 10 seconds bilaterally on 3/3 trials to aid in balance, pressure management, and stability with gait, work-related tasks, stair negotiation, and ADLs. Eval status: right- 8 sec with mod-max postural sway. Left 2 sec with max  postural sway. PN Status 7/28/2023: Progressing - right-12 sec with mod-max postural sway. Left- 8 sec with mod postural sway.   PN Status 9/6/2023: not formally tested today d/t focus on internal treatment  PN Status 9/20/2023: Progressing - right- 10 sec, left- 10 sec but requires 4+ trials each and demos mod

## 2023-09-27 ENCOUNTER — HOSPITAL ENCOUNTER (OUTPATIENT)
Facility: HOSPITAL | Age: 78
Setting detail: RECURRING SERIES
Discharge: HOME | End: 2023-09-30
Payer: MEDICARE

## 2023-09-27 PROCEDURE — 97112 NEUROMUSCULAR REEDUCATION: CPT

## 2023-09-27 PROCEDURE — 97110 THERAPEUTIC EXERCISES: CPT

## 2023-09-27 NOTE — PROGRESS NOTES
PHYSICAL / OCCUPATIONAL THERAPY - DAILY TREATMENT NOTE (updated )    Patient Name: Anant Glover    Date: 2023    : 1945  Insurance: Payor: Deuce Flores / Plan: Mir Casillas GOLD PLUS HMO / Product Type: *No Product type* /      Patient  verified Yes     Visit #   Current / Total 10 25   Time   In / Out 8:35am 9:33am   Pain   In / Out 0 0   Subjective Functional Status/Changes: Pt reports pessary came out again and sxs are worse   Changes to: Allergies, Med Hx, Sx Hx?   no       TREATMENT AREA =  Other symptoms and signs involving the genitourinary system [R39.89]    OBJECTIVE    Therapeutic Procedures: Tx Min Billable or 1:1 Min (if diff from Tx Min) Procedure, Rationale, Specifics   13  66394 Therapeutic Exercise (timed):  increase ROM, strength, coordination, balance, and proprioception to improve patient's ability to progress to PLOF and address remaining functional goals. (see flow sheet as applicable)     Details if applicable:  kegels with/without ADD ball squeeze in sitting, kegels with hip rotations   45  89771 Neuromuscular Re-Education (timed):  improve balance, coordination, kinesthetic sense, posture, core stability and proprioception to improve patient's ability to develop conscious control of individual muscles and awareness of position of extremities in order to progress to PLOF and address remaining functional goals. (see flow sheet as applicable)     Details if applicable:  biofeedback training (see below)   NP  44682 Manual Therapy (timed):  increase ROM, increase tissue extensibility, and increase postural awareness to improve patient's ability to progress to PLOF and address remaining functional goals. The manual therapy interventions were performed at a separate and distinct time from the therapeutic activities interventions .  (see flow sheet as applicable)     Details if applicable:     NP   48024 Therapeutic Activity (timed):  use of dynamic activities replicating

## 2023-10-03 ENCOUNTER — APPOINTMENT (OUTPATIENT)
Facility: HOSPITAL | Age: 78
End: 2023-10-03
Payer: MEDICARE

## 2023-10-11 ENCOUNTER — HOSPITAL ENCOUNTER (OUTPATIENT)
Facility: HOSPITAL | Age: 78
Setting detail: RECURRING SERIES
Discharge: HOME | End: 2023-10-14
Payer: MEDICARE

## 2023-10-11 PROCEDURE — 97110 THERAPEUTIC EXERCISES: CPT

## 2023-10-11 PROCEDURE — 97112 NEUROMUSCULAR REEDUCATION: CPT

## 2023-10-11 PROCEDURE — 97530 THERAPEUTIC ACTIVITIES: CPT

## 2023-10-11 NOTE — PROGRESS NOTES
Goals: To be accomplished in 12 weeks:  1) BLADDER- Patient will report urinary frequency WNL (every 2-3 hours daytime, 0-1x night time) and fluid intake WNL (>9 8oz glasses of water per day), and ability to void without straining or difficulty on 80% of occasions for improved urinary function and reduced incidence of UI. Eval status: reports that in PM it often takes 30-45 min of adjusting her position to successfully void; occasional UUI (every 1-2 days) and burning pain with urinary urge; fluid intake of 24 fl oz water per day and occasional soft drink. PN Status 9/6/2023: Slowly progressing - intermittent difficulty with starting urinary stream but on some days she has no difficulty at all  PN Status 9/20/2023: Progressing - reports that she hasn't had difficulty urinating since pessary was reinserted at [facility] [place] office 2 days ago     2) ROM- Pt will have painfree hip ER and IR PROM that is [facility] and symmetrical bilaterally to aid in functional mechanics for ADLs and pelvic girdle mobility for equal pressure distribution with walking, bending, lifting  Eval status: hip PROM (right/left): FAER- 22/22 deg. FAIR- 35/35 deg. Flexion- 105/90 deg. PN Status 9/6/2023: No change since last PN -   PN Status 9/20/2023: Progressing - pre tx hip PROM (right/left): FAER- 40/45 deg. FAIR- 25/28  deg. Flexion- 105/109  deg. 3) COORDINATION- Pt will effectively demonstrate diaphragmatic breathing and proper firing patterns of PFM in relation to core musculature to improve ability to void without straining or double-voiding to aide in proper micturition without pain or straining  Eval status: apical breathing pattern with anterior rib flare (left>right); will assess PFM at future visit  PN Status 9/6/2023: Progressing - per internal vaginal exam today; observed 1/5MMT with severely impaired coordination, delayed and incomplete relaxation, and abdominal and gluteal substitution patterns.  No palpable descent of PFM in response

## 2023-10-18 ENCOUNTER — HOSPITAL ENCOUNTER (OUTPATIENT)
Facility: HOSPITAL | Age: 78
Setting detail: RECURRING SERIES
Discharge: HOME | End: 2023-10-21
Payer: MEDICARE

## 2023-10-18 PROCEDURE — 97530 THERAPEUTIC ACTIVITIES: CPT

## 2023-10-18 PROCEDURE — 97112 NEUROMUSCULAR REEDUCATION: CPT

## 2023-10-18 PROCEDURE — 97110 THERAPEUTIC EXERCISES: CPT

## 2023-10-18 NOTE — PROGRESS NOTES
PHYSICAL / OCCUPATIONAL THERAPY - DAILY TREATMENT NOTE (updated )    Patient Name: Jenn Donaldson    Date: 10/18/2023    : 1945  Insurance: Payor: Tyronne Hodgkins / Plan: [employer] GOLD PLUS HMO / Product Type: *No Product type* /      Patient  verified Yes     Visit #   Current / Total 12 25   Time   In / Out 2:04pm 2:58pm   Pain   In / Out 0 0   Subjective Functional Status/Changes: Reports \"no problems\" from  to Tuesday evening. Mild problems on Tuesday evening. Changes to: Allergies, Med Hx, Sx Hx?   no       TREATMENT AREA =  Other symptoms and signs involving the genitourinary system [R39.89]    OBJECTIVE    Therapeutic Procedures: Tx Min Billable or 1:1 Min (if diff from Tx Min) Procedure, Rationale, Specifics   25  46395 Therapeutic Exercise (timed):  increase ROM, strength, coordination, balance, and proprioception to improve patient's ability to progress to PLOF and address remaining functional goals. (see flow sheet as applicable)     Details if applicable:  hip PROM with belt-assisted lateral distraction; hip strengthening activities   16  17276 Neuromuscular Re-Education (timed):  improve balance, coordination, kinesthetic sense, posture, core stability and proprioception to improve patient's ability to develop conscious control of individual muscles and awareness of position of extremities in order to progress to PLOF and address remaining functional goals. (see flow sheet as applicable)     Details if applicable:  deep core activation in sidelying with tactile and verbal cues   13  29906 Therapeutic Activity (timed):  use of dynamic activities replicating functional movements to increase ROM, strength, coordination, balance, and proprioception in order to improve patient's ability to progress to PLOF and address remaining functional goals.   (see flow sheet as applicable)     Details if applicable:  HEP revisions, breathing/lifting mechanics           Details if applicable:
hip PROM (right/left): FAER- 45/40 deg. FAIR- 35/28!  deg. Flexion- 120/112  deg. Post tx hip PROM (right/left): FAIR- NT/30!  deg.     3) COORDINATION- Pt will effectively demonstrate diaphragmatic breathing and proper firing patterns of PFM in relation to core musculature to improve ability to void without straining or double-voiding to aide in proper micturition without pain or straining  Eval status: apical breathing pattern with anterior rib flare (left>right); will assess PFM at future visit  PN Status 9/6/2023: Progressing - per internal vaginal exam today; observed 1/5MMT with severely impaired coordination, delayed and incomplete relaxation, and abdominal and gluteal substitution patterns. No palpable descent of PFM in response to diaphragmatic breathing  PN Status 9/20/2023: Progressing - per sEMG biofeedback training on visit 8 and 9; patient making observable progress in coordination of pelvic floor muscles in seated position; cont to rely on co-contraction with hip rotators or adductors to facilitate contraction and relaxation; cont to benefit from max cues for coordination of breath and movement. Demos elevated resting tone of 7mV today; reduced to 4mV with balloon inflation activity  PN Status 10/18/2023: Progressing - (on 10/11/23) per sEMG biofeedback; pt demos improvement in resting tone to nearly WNL (vs high tone in previous sessions) and improved coordination as evidenced by ability to perform kegels in seated and standing position without use of ADD co-ctx or substitution muscles. Continues to require max cues to correct paradoxical breathing pattern but improved with practice today. 4) FUNCTIONAL- Pt will demo ability to perform pain-free double-leg squat 10 x to 18'' depth (standard height chair) with maintenance of neutral lumbar spine, equal weight bearing, and ankle awareness for improved ability to perform ADLs and household chore related tasks.   Eval status: right lateral shift,

## 2023-10-25 ENCOUNTER — HOSPITAL ENCOUNTER (OUTPATIENT)
Facility: HOSPITAL | Age: 78
Setting detail: RECURRING SERIES
Discharge: HOME | End: 2023-10-28
Payer: MEDICARE

## 2023-10-25 PROCEDURE — 97535 SELF CARE MNGMENT TRAINING: CPT

## 2023-10-25 PROCEDURE — 97530 THERAPEUTIC ACTIVITIES: CPT

## 2023-10-25 PROCEDURE — 97140 MANUAL THERAPY 1/> REGIONS: CPT

## 2023-10-25 NOTE — PROGRESS NOTES
Patient will report urinary frequency WNL (every 2-3 hours daytime, 0-1x night time) and fluid intake WNL (>9 8oz glasses of water per day), and ability to void without straining or difficulty on 80% of occasions for improved urinary function and reduced incidence of UI. Eval status: reports that in PM it often takes 30-45 min of adjusting her position to successfully void; occasional UUI (every 1-2 days) and burning pain with urinary urge; fluid intake of 24 fl oz water per day and occasional soft drink. PN Status 10/18/2023: Murdock Fontan - pt reports that she had no pelvic pressure or bladder symptoms for 2.5 days followed by mild symptoms last night that resolved   Status 10/25/23: pt reports observing correlation between BM irregularity and increased cystocele severity. discussed role of constipation in prolapse sxs and educated patient on bowel routine, toilet positioning, constipation management strategies     2) ROM- Pt will have painfree hip ER and IR PROM that is [facility] and symmetrical bilaterally to aid in functional mechanics for ADLs and pelvic girdle mobility for equal pressure distribution with walking, bending, lifting  Eval status: hip PROM (right/left): FAER- 22/22 deg. FAIR- 35/35 deg. Flexion- 105/90 deg. PN Status 10/18/2023: Progressing - Pre tx hip PROM (right/left): FAER- 45/40 deg. FAIR- 35/28!  deg. Flexion- 120/112  deg. Post tx hip PROM (right/left):   FAIR- NT/30!  deg.     3) COORDINATION- Pt will effectively demonstrate diaphragmatic breathing and proper firing patterns of PFM in relation to core musculature to improve ability to void without straining or double-voiding to aide in proper micturition without pain or straining  Eval status: apical breathing pattern with anterior rib flare (left>right); will assess PFM at future visit  PN Status 10/18/2023: Progressing - (on 10/11/23) per sEMG biofeedback; pt demos improvement in resting tone to nearly WNL (vs high tone in previous

## 2023-11-01 ENCOUNTER — HOSPITAL ENCOUNTER (OUTPATIENT)
Facility: HOSPITAL | Age: 78
Setting detail: RECURRING SERIES
Discharge: HOME | End: 2023-11-04
Payer: MEDICARE

## 2023-11-01 PROCEDURE — 97110 THERAPEUTIC EXERCISES: CPT

## 2023-11-01 PROCEDURE — 97112 NEUROMUSCULAR REEDUCATION: CPT

## 2023-11-01 PROCEDURE — 97530 THERAPEUTIC ACTIVITIES: CPT

## 2023-11-01 NOTE — PROGRESS NOTES
PHYSICAL / OCCUPATIONAL THERAPY - DAILY TREATMENT NOTE (updated )    Patient Name: Veronica Jacobo    Date: 2023    : 1945  Insurance: Payor: Brenden Atkins / Plan: [employer] PLUS HMO / Product Type: *No Product type* /      Patient  verified Yes     Visit #   Current / Total 14 25   Time   In / Out 2:10pm 3:04pm   Pain   In / Out 0 0   Subjective Functional Status/Changes: Pt arrived 10 min late d/t traffic. Reports 2-3 \"awful days\"  then minimal symptoms on the other days. Bad days were associated with lack of bowel movement. Still reports BM every other day. She tried ILU massage and found it beneficial.   Changes to: Allergies, Med Hx, Sx Hx?   no       TREATMENT AREA =  Other symptoms and signs involving the genitourinary system [R39.89]    OBJECTIVE    Therapeutic Procedures: Tx Min Billable or 1:1 Min (if diff from Tx Min) Procedure, Rationale, Specifics   15  66011 Therapeutic Exercise (timed):  increase ROM, strength, coordination, balance, and proprioception to improve patient's ability to progress to PLOF and address remaining functional goals. (see flow sheet as applicable)     Details if applicable:  left hip PROM with belt-assisted lateral distraction + exercises on flow sheet   10  56061 Neuromuscular Re-Education (timed):  improve balance, coordination, kinesthetic sense, posture, core stability and proprioception to improve patient's ability to develop conscious control of individual muscles and awareness of position of extremities in order to progress to PLOF and address remaining functional goals. (see flow sheet as applicable)     Details if applicable:  SLS balance training,    29  34409 Therapeutic Activity (timed):  use of dynamic activities replicating functional movements to increase ROM, strength, coordination, balance, and proprioception in order to improve patient's ability to progress to PLOF and address remaining functional goals.   (see flow sheet as

## 2023-11-15 ENCOUNTER — HOSPITAL ENCOUNTER (OUTPATIENT)
Facility: HOSPITAL | Age: 78
Setting detail: RECURRING SERIES
Discharge: HOME | End: 2023-11-18
Payer: MEDICARE

## 2023-11-15 PROCEDURE — 97112 NEUROMUSCULAR REEDUCATION: CPT

## 2023-11-15 PROCEDURE — 97110 THERAPEUTIC EXERCISES: CPT

## 2023-11-15 PROCEDURE — 97530 THERAPEUTIC ACTIVITIES: CPT

## 2023-11-15 NOTE — PROGRESS NOTES
PHYSICAL / OCCUPATIONAL THERAPY - DAILY TREATMENT NOTE (updated )    Patient Name: Jessica Castrejon    Date: 11/15/2023    : 1945  Insurance: Payor: Vivien Arcos / Plan: Bala HARRIS HMO / Product Type: *No Product type* /      Patient  verified Yes     Visit #   Current / Total 15 25   Time   In / Out 10:38am 11:33am   Pain   In / Out 0 0   Subjective Functional Status/Changes: Reports UUI on a daily basis last week. The week prior she had a \"really good week. \"   Bowel symptoms have been better over past few weeks using PT recs  Walking, lifting are biggest aggravators. Sometimes when her bladder is full. Changes to: Allergies, Med Hx, Sx Hx?   no       TREATMENT AREA =  Other symptoms and signs involving the genitourinary system [R39.89]    OBJECTIVE    Therapeutic Procedures: Tx Min Billable or 1:1 Min (if diff from Tx Min) Procedure, Rationale, Specifics   15  06553 Therapeutic Exercise (timed):  increase ROM, strength, coordination, balance, and proprioception to improve patient's ability to progress to PLOF and address remaining functional goals. (see flow sheet as applicable)     Details if applicable:  HEP review with cueing and progressions   12  96222 Neuromuscular Re-Education (timed):  improve balance, coordination, kinesthetic sense, posture, core stability and proprioception to improve patient's ability to develop conscious control of individual muscles and awareness of position of extremities in order to progress to PLOF and address remaining functional goals. (see flow sheet as applicable)     Details if applicable:  balance training in SLS    28  49603 Therapeutic Activity (timed):  use of dynamic activities replicating functional movements to increase ROM, strength, coordination, balance, and proprioception in order to improve patient's ability to progress to PLOF and address remaining functional goals.   (see flow sheet as applicable)     Details if applicable:  gait

## 2023-11-22 ENCOUNTER — APPOINTMENT (OUTPATIENT)
Facility: HOSPITAL | Age: 78
End: 2023-11-22
Payer: MEDICARE

## 2023-11-29 ENCOUNTER — APPOINTMENT (OUTPATIENT)
Facility: HOSPITAL | Age: 78
End: 2023-11-29
Payer: MEDICARE

## 2023-11-30 ENCOUNTER — APPOINTMENT (OUTPATIENT)
Facility: HOSPITAL | Age: 78
End: 2023-11-30
Payer: MEDICARE

## 2023-12-05 ENCOUNTER — HOSPITAL ENCOUNTER (OUTPATIENT)
Facility: HOSPITAL | Age: 78
Setting detail: RECURRING SERIES
Discharge: HOME | End: 2023-12-08
Payer: MEDICARE

## 2023-12-05 PROCEDURE — 97110 THERAPEUTIC EXERCISES: CPT

## 2023-12-05 PROCEDURE — 97112 NEUROMUSCULAR REEDUCATION: CPT

## 2023-12-05 PROCEDURE — 97530 THERAPEUTIC ACTIVITIES: CPT

## 2023-12-05 NOTE — PROGRESS NOTES
[facility] at the [place] [address] [address]  Phone: [phone]      Fax:  [phone]    Progress Note    Patient name: Aren Garvey Start of Care: 2023   Referral source: Farnaz Cruz MD : 1945               Medical Diagnosis: Incomplete uterovaginal prolapse [N81.2]        Onset Date:2021               Treatment Diagnosis: R39.89  Other signs and symptoms of genitourinary system                            Prior Hospitalization: see medical history Provider#: [id number]   Medications: Verified on Patient Summary List      Comorbidities: Arthritis, HTN, Hx stroke, and Hemorrhoids, hx 3 pregnancies/deliveries,  Polymyositis. History of falling. Right medial knee pain. Right shoulder pain. Prior Level of Function: Prior to , patient denies any urinary voiding discomfort, activity limitation 2/2 pelvic pressure, or vaginal discomfort     Visits from Start of Care: 16    Missed Visits: 2    Updated Goals/Measure of Progress: To be achieved in 1 visit:    Short Term Goals: To be accomplished in 4 weeks:  1) HEP- Patient will be independent and compliant with HEP to progress toward goals and restore functional mobility. Eval Status: issued at eval   PN Status 10/18/2023: Progressing - good compliance but cont to require cues for coordination of breath and movement  Status 23: added SLS balance training  PN Status 2023: Progressing - reviewed A day activites; add B day next time (standing activities)     2) QOL- Pt will report 2 point reduction in the severity of their problem on a scale of 1-10 in terms of how significantly their complaint impacts their quality of life. Eval status: rates severity 10/10; increased pelvic pressure with walking around stores, picking up heavy things.    PN Status 2023: MET - 6/10 severity     3) FOTO- Patient will improve FOTO score by 10 points for improved self-efficacy in

## 2023-12-05 NOTE — PROGRESS NOTES
PHYSICAL / OCCUPATIONAL THERAPY - DAILY TREATMENT NOTE (updated )    Patient Name: Anant Glover    Date: 2023    : 1945  Insurance: Payor: Deuce Flores / Plan: Joy HARRIS HMO / Product Type: *No Product type* /      Patient  verified Yes     Visit #   Current / Total 16 25   Time   In / Out 10:00am 11:00am   Pain   In / Out 0 0   Subjective Functional Status/Changes: Pt reports that things have been \"not good for several reasons. \" She pulled a muscle in the gluteal region and was in a lot of pain; received a shot for this at MD office which helped. States her doctor she saw for glute pain recommended surgery for bladder prolapse. Has PT referral for balance   Changes to: Allergies, Med Hx, Sx Hx?   no       TREATMENT AREA =  Other symptoms and signs involving the genitourinary system [R39.89]    OBJECTIVE    Therapeutic Procedures: Tx Min Billable or 1:1 Min (if diff from Tx Min) Procedure, Rationale, Specifics   20  60681 Therapeutic Exercise (timed):  increase ROM, strength, coordination, balance, and proprioception to improve patient's ability to progress to PLOF and address remaining functional goals. (see flow sheet as applicable)     Details if applicable:  HEP review with cueing and progressions   9  94993 Neuromuscular Re-Education (timed):  improve balance, coordination, kinesthetic sense, posture, core stability and proprioception to improve patient's ability to develop conscious control of individual muscles and awareness of position of extremities in order to progress to PLOF and address remaining functional goals. (see flow sheet as applicable)     Details if applicable:  balance training in SLS    31  51525 Therapeutic Activity (timed):  use of dynamic activities replicating functional movements to increase ROM, strength, coordination, balance, and proprioception in order to improve patient's ability to progress to PLOF and address remaining functional goals.   (see

## 2023-12-20 ENCOUNTER — APPOINTMENT (OUTPATIENT)
Facility: HOSPITAL | Age: 78
End: 2023-12-20
Payer: MEDICARE

## 2023-12-27 ENCOUNTER — APPOINTMENT (OUTPATIENT)
Facility: HOSPITAL | Age: 78
End: 2023-12-27
Payer: MEDICARE

## 2024-11-04 ENCOUNTER — TELEPHONE (OUTPATIENT)
Facility: HOSPITAL | Age: 79
End: 2024-11-04

## 2024-11-06 ENCOUNTER — HOSPITAL ENCOUNTER (OUTPATIENT)
Facility: HOSPITAL | Age: 79
Setting detail: RECURRING SERIES
Discharge: HOME | End: 2024-11-09
Payer: MEDICARE

## 2024-11-06 PROCEDURE — 97161 PT EVAL LOW COMPLEX 20 MIN: CPT

## 2024-11-06 NOTE — PROGRESS NOTES
PT DAILY TREATMENT NOTE/NEURO EVAL 10-18    Patient Name: Gay Aquino  Date:2024  : 1945  [x]  Patient  Verified  Payor: HUMANA MEDICARE / Plan: HUMANA CHOICE-PPO MEDICARE / Product Type: *No Product type* /    In time:10:55  Out time:11:30  Total Treatment Time (min): 35  Visit #: 1 of 16    Medicare/BCBS Only   Total Timed Codes (min):  0 1:1 Treatment Time:  35     Treatment Area: Other abnormalities of gait and mobility [R26.89]    SUBJECTIVE  Pain Level (0-10 scale): No reports of pain  []constant []intermittent []improving []worsening []no change since onset    Any medication changes, allergies to medications, adverse drug reactions, diagnosis change, or new procedure performed?: [x] No    [] Yes (see summary sheet for update)  Subjective functional status/changes:     Patient has CC of unsteadiness and balance issues since onset of pelvic organ prolapse in  which impacted ADL's and participation in community, resulting in significant change in functional status and deconditioning. Pt was seen at pelvic floor therapy after surgical intervention on 9/3/2024 for bladder prolapse, and is now cleared for activity, seeking improvements in strength, tolerance for activities and improved balance. Reports fear of falling. Reports 1 fall 6 months, but multiple near-falls over the past year (unable to provide specific frequency)  Reports recent minor accident with head trauma visible with ecchymosis over the left eyebrow. Reports some subsequent minor headaches since incident 10 days ago, denies dizziness and vision changes. RUDY: insidious/atraumatic Pt denies any pain or numbness/tingling. Denies popping/clicking. Aggravating factors:standing and walking. Alleviating factors: rest.  Denies red flags: SOB, chest pain, dizziness/lightheadedness, blurred/double vision, HA, chills/fevers, night sweats, change in bowel/bladder control, abdominal pain, difficulty swallowing, slurred speech,

## 2024-11-06 NOTE — PROGRESS NOTES
[facility] at [facility]  [address] [address]  Phone: [phone]   Fax: [phone]    Plan of Care/ Statement of Necessity for Physical Therapy Services    Patient name: Gay Aquino Start of Care: 2024   Referral source: Susan Noyola MD : 1945    Medical Diagnosis: Other abnormalities of gait and mobility [R26.89]      Onset Date:2022    Treatment Diagnosis:  R26.89  Abnormalities of gait and mobility and M62.81  GENERAL MUSCLE WEAKNESS                                          Prior Hospitalization: see medical history Provider#: [id number]   Medications: Verified on Patient Summary List     Comorbidities: Other: polmyositis, osteoporosis, OA, HTN, pelvic organ prolapse (surgical intervention 9/3/2024)    Prior Level of Function: functionally independent with no AD, gardening      The Plan of Care and following information is based on the information from the initial evaluation.  Assessment/ key information:   Pt is a 78 year old female who presents to [facility] with complaints of generalized weakness and unsteadiness on her feet with onset in  after bout of bladder prolapse which significantly limited her abilities to complete ADLs and led to an overall decreased functional level from deconditioning over time. Pt is s/p surgical intervention for prolapse on 9/3/2024, and cleared for activity, seeking improvements in tolerance for weight-bearing activities and global strength to progress back to PLOF. Pt demonstrates global bilateral LE weakness and balance deficits evidenced by high fall risk category in Tinetti balance and gait assessment. Overall pt reports a fear of falling and demonstrates fear avoidance during weight bearing activities, likely due to generalized deconditioning following chronic, multifactorial medical course.     Patient will benefit from skilled PT services to modify and progress therapeutic interventions, address

## 2024-11-11 ENCOUNTER — HOSPITAL ENCOUNTER (OUTPATIENT)
Facility: HOSPITAL | Age: 79
Setting detail: RECURRING SERIES
Discharge: HOME | End: 2024-11-14
Payer: MEDICARE

## 2024-11-11 PROCEDURE — 97112 NEUROMUSCULAR REEDUCATION: CPT

## 2024-11-11 PROCEDURE — 97535 SELF CARE MNGMENT TRAINING: CPT

## 2024-11-11 PROCEDURE — 97110 THERAPEUTIC EXERCISES: CPT

## 2024-11-11 NOTE — PROGRESS NOTES
PHYSICAL / OCCUPATIONAL THERAPY - DAILY TREATMENT NOTE      Patient Name: Gay Aquino    Date: 2024    : 1945  Insurance: Payor: HUMANA MEDICARE / Plan: HUMANA CHOICE-PPO MEDICARE / Product Type: *No Product type* /             Patient  verified Yes      Visit #   Current / Total 2 16    Time   In / Out 2:53 3:41    Pain   In / Out 0 0    Subjective Functional Status/Changes: \"We had a really busy weekend with birthday celebrations, but it went well! I haven't been able to do many of my exercises because I lost my sheet.\"   Changes to:  Allergies, Med Hx, Sx Hx?    no        TREATMENT AREA =  Other abnormalities of gait and mobility [R26.89]  Muscle weakness (generalized) [M62.81]     If an interpreting service is utilized for treatment of this patient, the contents of this document represent the material reviewed with the patient via the .      OBJECTIVE     Therapeutic Procedures:  Tx Min Billable or 1:1 Min (if diff from Tx Min) Procedure, Rationale, Specifics   20 10 15608 Therapeutic Exercise (timed):  increase ROM, strength, coordination, balance, and proprioception to improve patient's ability to progress to PLOF and address remaining functional goals. (see flow sheet as applicable)     Details if applicable:        12  34585 Neuromuscular Re-Education (timed):  improve balance, coordination, kinesthetic sense, posture, core stability and proprioception to improve patient's ability to develop conscious control of individual muscles and awareness of position of extremities in order to progress to PLOF and address remaining functional goals. (see flow sheet as applicable)     Details if applicable:      90312 Self Care/Home Management (timed):  improve patient knowledge and understanding of positioning, posture/ergonomics, and activity modification  to improve patient's ability to progress to PLOF and address remaining functional goals.  (see flow sheet as applicable)

## 2024-11-13 ENCOUNTER — TELEPHONE (OUTPATIENT)
Facility: HOSPITAL | Age: 79
End: 2024-11-13

## 2024-11-13 ENCOUNTER — HOSPITAL ENCOUNTER (OUTPATIENT)
Facility: HOSPITAL | Age: 79
Setting detail: RECURRING SERIES
Discharge: HOME | End: 2024-11-16
Payer: MEDICARE

## 2024-11-13 PROCEDURE — 97530 THERAPEUTIC ACTIVITIES: CPT

## 2024-11-13 PROCEDURE — 97112 NEUROMUSCULAR REEDUCATION: CPT

## 2024-11-18 ENCOUNTER — HOSPITAL ENCOUNTER (OUTPATIENT)
Facility: HOSPITAL | Age: 79
Setting detail: RECURRING SERIES
Discharge: HOME | End: 2024-11-21
Payer: MEDICARE

## 2024-11-18 ENCOUNTER — TELEPHONE (OUTPATIENT)
Facility: HOSPITAL | Age: 79
End: 2024-11-18

## 2024-11-18 PROCEDURE — 97530 THERAPEUTIC ACTIVITIES: CPT

## 2024-11-18 PROCEDURE — 97110 THERAPEUTIC EXERCISES: CPT

## 2024-11-18 PROCEDURE — 97112 NEUROMUSCULAR REEDUCATION: CPT

## 2024-11-18 NOTE — PROGRESS NOTES
PHYSICAL / OCCUPATIONAL THERAPY - DAILY TREATMENT NOTE     Patient Name: Gay Aquino    Date: 2024    : 1945  Insurance: Payor: HUMANA MEDICARE / Plan: HUMANA CHOICE-PPO MEDICARE / Product Type: *No Product type* /      Patient  verified Yes     Visit #   Current / Total 4 16   Time   In / Out 15:32 16:12   Pain   In / Out 7 7   Subjective Functional Status/Changes: Everything is hurting today   Changes to:  Allergies, Med Hx, Sx Hx?   no       TREATMENT AREA =  Other abnormalities of gait and mobility [R26.89]  Muscle weakness (generalized) [M62.81]    If an interpreting service is utilized for treatment of this patient, the contents of this document represent the material reviewed with the patient via the .     OBJECTIVE    Therapeutic Procedures:  Tx Min Billable or 1:1 Min (if diff from Tx Min) Procedure, Rationale, Specifics   16  91729 Therapeutic Exercise (timed):  increase ROM, strength, coordination, balance, and proprioception to improve patient's ability to progress to PLOF and address remaining functional goals. (see flow sheet as applicable)    Details if applicable:       12  91581 Neuromuscular Re-Education (timed):  improve balance, coordination, kinesthetic sense, posture, core stability and proprioception to improve patient's ability to develop conscious control of individual muscles and awareness of position of extremities in order to progress to PLOF and address remaining functional goals. (see flow sheet as applicable)    Details if applicable:     12  43897 Therapeutic Activity (timed):  use of dynamic activities replicating functional movements to increase ROM, strength, coordination, balance, and proprioception in order to improve patient's ability to progress to PLOF and address remaining functional goals.  (see flow sheet as applicable)     Details if applicable:           Details if applicable:            Details if applicable:     40  [facility] Totals

## 2024-11-20 ENCOUNTER — TELEPHONE (OUTPATIENT)
Facility: HOSPITAL | Age: 79
End: 2024-11-20

## 2024-11-20 ENCOUNTER — APPOINTMENT (OUTPATIENT)
Facility: HOSPITAL | Age: 79
End: 2024-11-20
Payer: MEDICARE

## 2024-11-25 ENCOUNTER — TELEPHONE (OUTPATIENT)
Facility: HOSPITAL | Age: 79
End: 2024-11-25

## 2024-11-25 ENCOUNTER — APPOINTMENT (OUTPATIENT)
Facility: HOSPITAL | Age: 79
End: 2024-11-25
Payer: MEDICARE

## 2024-11-25 NOTE — TELEPHONE ENCOUNTER
Per patient, she is having a lot of pain today. Cancel 11/25 and 11/27 appts. She will call back after the holiday to RS.

## 2024-11-27 ENCOUNTER — APPOINTMENT (OUTPATIENT)
Facility: HOSPITAL | Age: 79
End: 2024-11-27
Payer: MEDICARE

## 2024-12-10 ENCOUNTER — TELEPHONE (OUTPATIENT)
Facility: HOSPITAL | Age: 79
End: 2024-12-10

## 2024-12-10 NOTE — TELEPHONE ENCOUNTER
Called to inquire if pt wants to continue PT but pt wants to wait as hip still in pain. Informed pt will be DC if not sched by 12/18 due to 30-day policy. WCB to check in next week to try to sched.   normal (ped)... no acute distress. tearful

## 2025-01-30 ENCOUNTER — HOSPITAL ENCOUNTER (EMERGENCY)
Facility: HOSPITAL | Age: 80
Discharge: HOME | End: 2025-01-31
Payer: MEDICARE

## 2025-01-30 VITALS
HEART RATE: 61 BPM | RESPIRATION RATE: 18 BRPM | BODY MASS INDEX: 21.38 KG/M2 | DIASTOLIC BLOOD PRESSURE: 76 MMHG | WEIGHT: 133 LBS | SYSTOLIC BLOOD PRESSURE: 141 MMHG | TEMPERATURE: 97.2 F | HEIGHT: 66 IN | OXYGEN SATURATION: 100 %

## 2025-01-30 DIAGNOSIS — R04.0 EPISTAXIS: Primary | ICD-10-CM

## 2025-01-30 PROCEDURE — 99282 EMERGENCY DEPT VISIT SF MDM: CPT

## 2025-01-30 PROCEDURE — 30901 CONTROL OF NOSEBLEED: CPT

## 2025-01-30 PROCEDURE — 6370000000 HC RX 637 (ALT 250 FOR IP): Performed by: NURSE PRACTITIONER

## 2025-01-30 RX ORDER — OXYMETAZOLINE HYDROCHLORIDE 0.05 G/100ML
2 SPRAY NASAL
Status: COMPLETED | OUTPATIENT
Start: 2025-01-30 | End: 2025-01-30

## 2025-01-30 RX ADMIN — Medication 2 SPRAY: at 23:15

## 2025-01-31 PROCEDURE — 99282 EMERGENCY DEPT VISIT SF MDM: CPT

## 2025-01-31 NOTE — ED NOTES
Patient blew nose, large clot came out. Afrin sprayed into right nostril. Bleeding stopped. Patient given call bell to use if nose starts to bleed again. Provider notified.

## 2025-01-31 NOTE — ED PROVIDER NOTES
[facility]  EMERGENCY DEPARTMENT ENCOUNTER       Pt Name: Gay Aquino  MRN: [id number]  Birthdate 1945  Date of evaluation: 1/30/2025  PCP: Emily George DO  Note Started: 1:09 AM 1/30/25     CHIEF COMPLAINT       Chief Complaint   Patient presents with    Epistaxis        HISTORY OF PRESENT ILLNESS: 1 or more elements      History From: Patient  HPI Limitations: None  Chronic Conditions: HTN  Social Determinants affecting Dx or Tx: None       Gay Aquino is a 79 y.o. female who presents to ED c/o epi taxis that began around 7 PM.  No known injury, no recent URI symptoms.  Patient takes 81 mg of aspirin daily, no additional blood thinners.  No unusual bleeding or bruising, no dark or tarry stools.     Nursing Notes were all reviewed and agreed with or any disagreements were addressed in the HPI.    PAST HISTORY     Past Medical History:  Past Medical History:   Diagnosis Date    Hypertension     Other ill-defined conditions(799.89)     polymyocitis       Past Surgical History:  Past Surgical History:   Procedure Laterality Date    HEENT      tonsilectomy    OTHER SURGICAL HISTORY      muscle biopsy       Family History:  No family history on file.    Social History:  Social History     Socioeconomic History    Marital status:    Tobacco Use    Smoking status: Never   Substance and Sexual Activity    Alcohol use: No    Drug use: No       Allergies:  No Known Allergies    CURRENT MEDICATIONS      No current facility-administered medications for this encounter.     Current Outpatient Medications   Medication Sig Dispense Refill    aspirin 81 MG chewable tablet Take 81 mg by mouth daily      azaTHIOprine (IMURAN) 50 MG tablet Take 50 mg by mouth daily      potassium chloride (MICRO-K) 10 MEQ extended release capsule Take 10 mEq by mouth 2 times daily            PHYSICAL EXAM      Vitals:    01/30/25 2208   BP: (!) 141/76   Pulse: 61   Resp: 18   Temp: 97.2 °F (36.2 °C)